# Patient Record
Sex: MALE | Race: OTHER | NOT HISPANIC OR LATINO | URBAN - METROPOLITAN AREA
[De-identification: names, ages, dates, MRNs, and addresses within clinical notes are randomized per-mention and may not be internally consistent; named-entity substitution may affect disease eponyms.]

---

## 2018-01-03 ENCOUNTER — INPATIENT (INPATIENT)
Facility: HOSPITAL | Age: 28
LOS: 4 days | Discharge: HOME | DRG: 872 | End: 2018-01-08
Attending: INTERNAL MEDICINE | Admitting: INTERNAL MEDICINE
Payer: COMMERCIAL

## 2018-01-03 VITALS
RESPIRATION RATE: 18 BRPM | HEART RATE: 109 BPM | TEMPERATURE: 102 F | OXYGEN SATURATION: 98 % | SYSTOLIC BLOOD PRESSURE: 113 MMHG | WEIGHT: 113.1 LBS | HEIGHT: 65 IN | DIASTOLIC BLOOD PRESSURE: 69 MMHG

## 2018-01-03 LAB
ALBUMIN SERPL ELPH-MCNC: 3.3 G/DL — SIGNIFICANT CHANGE UP (ref 3.3–5)
ALP SERPL-CCNC: 69 U/L — SIGNIFICANT CHANGE UP (ref 30–120)
ALT FLD-CCNC: 17 U/L DA — SIGNIFICANT CHANGE UP (ref 10–60)
ANION GAP SERPL CALC-SCNC: 10 MMOL/L — SIGNIFICANT CHANGE UP (ref 5–17)
APPEARANCE UR: ABNORMAL
APTT BLD: 31.5 SEC — SIGNIFICANT CHANGE UP (ref 27.5–37.4)
AST SERPL-CCNC: 15 U/L — SIGNIFICANT CHANGE UP (ref 10–40)
BASOPHILS # BLD AUTO: 0.1 K/UL — SIGNIFICANT CHANGE UP (ref 0–0.2)
BASOPHILS NFR BLD AUTO: 1.1 % — SIGNIFICANT CHANGE UP (ref 0–2)
BILIRUB SERPL-MCNC: 0.5 MG/DL — SIGNIFICANT CHANGE UP (ref 0.2–1.2)
BILIRUB UR-MCNC: NEGATIVE — SIGNIFICANT CHANGE UP
BUN SERPL-MCNC: 17 MG/DL — SIGNIFICANT CHANGE UP (ref 7–23)
CALCIUM SERPL-MCNC: 8.8 MG/DL — SIGNIFICANT CHANGE UP (ref 8.4–10.5)
CHLORIDE SERPL-SCNC: 98 MMOL/L — SIGNIFICANT CHANGE UP (ref 96–108)
CO2 SERPL-SCNC: 25 MMOL/L — SIGNIFICANT CHANGE UP (ref 22–31)
COLOR SPEC: YELLOW — SIGNIFICANT CHANGE UP
CREAT SERPL-MCNC: 1.23 MG/DL — SIGNIFICANT CHANGE UP (ref 0.5–1.3)
DIFF PNL FLD: ABNORMAL
EOSINOPHIL # BLD AUTO: 0 K/UL — SIGNIFICANT CHANGE UP (ref 0–0.5)
EOSINOPHIL NFR BLD AUTO: 0.1 % — SIGNIFICANT CHANGE UP (ref 0–6)
GLUCOSE SERPL-MCNC: 112 MG/DL — HIGH (ref 70–99)
GLUCOSE UR QL: NEGATIVE MG/DL — SIGNIFICANT CHANGE UP
HCT VFR BLD CALC: 41 % — SIGNIFICANT CHANGE UP (ref 39–50)
HGB BLD-MCNC: 12.8 G/DL — LOW (ref 13–17)
INR BLD: 1.27 RATIO — HIGH (ref 0.88–1.16)
KETONES UR-MCNC: NEGATIVE — SIGNIFICANT CHANGE UP
LACTATE SERPL-SCNC: 0.8 MMOL/L — SIGNIFICANT CHANGE UP (ref 0.7–2)
LEUKOCYTE ESTERASE UR-ACNC: ABNORMAL
LYMPHOCYTES # BLD AUTO: 0.9 K/UL — LOW (ref 1–3.3)
LYMPHOCYTES # BLD AUTO: 9 % — LOW (ref 13–44)
MCHC RBC-ENTMCNC: 25.9 PG — LOW (ref 27–34)
MCHC RBC-ENTMCNC: 31.2 GM/DL — LOW (ref 32–36)
MCV RBC AUTO: 83.2 FL — SIGNIFICANT CHANGE UP (ref 80–100)
MONOCYTES # BLD AUTO: 1 K/UL — HIGH (ref 0–0.9)
MONOCYTES NFR BLD AUTO: 10.1 % — SIGNIFICANT CHANGE UP (ref 2–14)
NEUTROPHILS # BLD AUTO: 8.1 K/UL — HIGH (ref 1.8–7.4)
NEUTROPHILS NFR BLD AUTO: 79.8 % — HIGH (ref 43–77)
NITRITE UR-MCNC: POSITIVE
PH UR: 8 — SIGNIFICANT CHANGE UP (ref 5–8)
PLATELET # BLD AUTO: 231 K/UL — SIGNIFICANT CHANGE UP (ref 150–400)
POTASSIUM SERPL-MCNC: 3.7 MMOL/L — SIGNIFICANT CHANGE UP (ref 3.5–5.3)
POTASSIUM SERPL-SCNC: 3.7 MMOL/L — SIGNIFICANT CHANGE UP (ref 3.5–5.3)
PROT SERPL-MCNC: 7.9 G/DL — SIGNIFICANT CHANGE UP (ref 6–8.3)
PROT UR-MCNC: 30 MG/DL
PROTHROM AB SERPL-ACNC: 13.9 SEC — HIGH (ref 9.8–12.7)
RBC # BLD: 4.92 M/UL — SIGNIFICANT CHANGE UP (ref 4.2–5.8)
RBC # FLD: 12.6 % — SIGNIFICANT CHANGE UP (ref 10.3–14.5)
SODIUM SERPL-SCNC: 133 MMOL/L — LOW (ref 135–145)
SP GR SPEC: 1.01 — SIGNIFICANT CHANGE UP (ref 1.01–1.02)
UROBILINOGEN FLD QL: NEGATIVE MG/DL — SIGNIFICANT CHANGE UP
WBC # BLD: 10.2 K/UL — SIGNIFICANT CHANGE UP (ref 3.8–10.5)
WBC # FLD AUTO: 10.2 K/UL — SIGNIFICANT CHANGE UP (ref 3.8–10.5)

## 2018-01-03 PROCEDURE — 74176 CT ABD & PELVIS W/O CONTRAST: CPT | Mod: 26

## 2018-01-03 RX ORDER — SODIUM CHLORIDE 9 MG/ML
1000 INJECTION INTRAMUSCULAR; INTRAVENOUS; SUBCUTANEOUS ONCE
Qty: 0 | Refills: 0 | Status: COMPLETED | OUTPATIENT
Start: 2018-01-03 | End: 2018-01-03

## 2018-01-03 RX ORDER — ACETAMINOPHEN 500 MG
650 TABLET ORAL ONCE
Qty: 0 | Refills: 0 | Status: COMPLETED | OUTPATIENT
Start: 2018-01-03 | End: 2018-01-03

## 2018-01-03 RX ORDER — SODIUM CHLORIDE 9 MG/ML
3 INJECTION INTRAMUSCULAR; INTRAVENOUS; SUBCUTANEOUS ONCE
Qty: 0 | Refills: 0 | Status: COMPLETED | OUTPATIENT
Start: 2018-01-03 | End: 2018-01-03

## 2018-01-03 RX ORDER — CEFTRIAXONE 500 MG/1
1 INJECTION, POWDER, FOR SOLUTION INTRAMUSCULAR; INTRAVENOUS ONCE
Qty: 0 | Refills: 0 | Status: COMPLETED | OUTPATIENT
Start: 2018-01-03 | End: 2018-01-03

## 2018-01-03 RX ADMIN — SODIUM CHLORIDE 1000 MILLILITER(S): 9 INJECTION INTRAMUSCULAR; INTRAVENOUS; SUBCUTANEOUS at 22:55

## 2018-01-03 RX ADMIN — SODIUM CHLORIDE 3 MILLILITER(S): 9 INJECTION INTRAMUSCULAR; INTRAVENOUS; SUBCUTANEOUS at 22:40

## 2018-01-03 RX ADMIN — Medication 650 MILLIGRAM(S): at 22:25

## 2018-01-03 RX ADMIN — SODIUM CHLORIDE 1000 MILLILITER(S): 9 INJECTION INTRAMUSCULAR; INTRAVENOUS; SUBCUTANEOUS at 21:55

## 2018-01-03 NOTE — ED PROVIDER NOTE - GASTROINTESTINAL, MLM
Abdomen soft, pos mild R mid abdominal tenderness. no guarding. no rebound. large healed scar diagonally across abdomen, LUQ to RLQ. stoma for suprapubic catheter noted.

## 2018-01-03 NOTE — ED PROVIDER NOTE - OBJECTIVE STATEMENT
28 y/o M presents to the ED for fever and chills today. States that he has been having shooting pain under his R rib x few days, worse after eating, better after BM. Today, he started experiencing fevers, chills, body aches, and dizziness. No sick contact. No n/v, or sore throat. 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 Pt uses suprapubic catheter to urinate. Hx of kidney stones- notes pain does not feel similar to a prior episode. No pmd. Urologist in [place].

## 2018-01-04 DIAGNOSIS — N12 TUBULO-INTERSTITIAL NEPHRITIS, NOT SPECIFIED AS ACUTE OR CHRONIC: ICD-10-CM

## 2018-01-04 DIAGNOSIS — Q64.79 OTHER CONGENITAL MALFORMATIONS OF BLADDER AND URETHRA: Chronic | ICD-10-CM

## 2018-01-04 DIAGNOSIS — N20.0 CALCULUS OF KIDNEY: ICD-10-CM

## 2018-01-04 LAB
ANION GAP SERPL CALC-SCNC: 10 MMOL/L — SIGNIFICANT CHANGE UP (ref 5–17)
BUN SERPL-MCNC: 12 MG/DL — SIGNIFICANT CHANGE UP (ref 7–23)
CALCIUM SERPL-MCNC: 8 MG/DL — LOW (ref 8.4–10.5)
CHLORIDE SERPL-SCNC: 104 MMOL/L — SIGNIFICANT CHANGE UP (ref 96–108)
CO2 SERPL-SCNC: 24 MMOL/L — SIGNIFICANT CHANGE UP (ref 22–31)
CREAT SERPL-MCNC: 1.13 MG/DL — SIGNIFICANT CHANGE UP (ref 0.5–1.3)
GLUCOSE SERPL-MCNC: 96 MG/DL — SIGNIFICANT CHANGE UP (ref 70–99)
HCT VFR BLD CALC: 36.6 % — LOW (ref 39–50)
HGB BLD-MCNC: 12 G/DL — LOW (ref 13–17)
MCHC RBC-ENTMCNC: 26.9 PG — LOW (ref 27–34)
MCHC RBC-ENTMCNC: 32.7 GM/DL — SIGNIFICANT CHANGE UP (ref 32–36)
MCV RBC AUTO: 82.2 FL — SIGNIFICANT CHANGE UP (ref 80–100)
PLATELET # BLD AUTO: 182 K/UL — SIGNIFICANT CHANGE UP (ref 150–400)
POTASSIUM SERPL-MCNC: 4 MMOL/L — SIGNIFICANT CHANGE UP (ref 3.5–5.3)
POTASSIUM SERPL-SCNC: 4 MMOL/L — SIGNIFICANT CHANGE UP (ref 3.5–5.3)
RBC # BLD: 4.45 M/UL — SIGNIFICANT CHANGE UP (ref 4.2–5.8)
RBC # FLD: 12.1 % — SIGNIFICANT CHANGE UP (ref 10.3–14.5)
SODIUM SERPL-SCNC: 138 MMOL/L — SIGNIFICANT CHANGE UP (ref 135–145)
WBC # BLD: 10.4 K/UL — SIGNIFICANT CHANGE UP (ref 3.8–10.5)
WBC # FLD AUTO: 10.4 K/UL — SIGNIFICANT CHANGE UP (ref 3.8–10.5)

## 2018-01-04 PROCEDURE — 99285 EMERGENCY DEPT VISIT HI MDM: CPT

## 2018-01-04 RX ORDER — SODIUM CHLORIDE 9 MG/ML
1000 INJECTION INTRAMUSCULAR; INTRAVENOUS; SUBCUTANEOUS ONCE
Qty: 0 | Refills: 0 | Status: COMPLETED | OUTPATIENT
Start: 2018-01-04 | End: 2018-01-04

## 2018-01-04 RX ORDER — ACETAMINOPHEN 500 MG
650 TABLET ORAL EVERY 6 HOURS
Qty: 0 | Refills: 0 | Status: DISCONTINUED | OUTPATIENT
Start: 2018-01-04 | End: 2018-01-08

## 2018-01-04 RX ORDER — FAMOTIDINE 10 MG/ML
20 INJECTION INTRAVENOUS DAILY
Qty: 0 | Refills: 0 | Status: DISCONTINUED | OUTPATIENT
Start: 2018-01-04 | End: 2018-01-08

## 2018-01-04 RX ORDER — SODIUM CHLORIDE 9 MG/ML
1000 INJECTION INTRAMUSCULAR; INTRAVENOUS; SUBCUTANEOUS
Qty: 0 | Refills: 0 | Status: DISCONTINUED | OUTPATIENT
Start: 2018-01-04 | End: 2018-01-06

## 2018-01-04 RX ORDER — IBUPROFEN 200 MG
400 TABLET ORAL THREE TIMES A DAY
Qty: 0 | Refills: 0 | Status: DISCONTINUED | OUTPATIENT
Start: 2018-01-04 | End: 2018-01-04

## 2018-01-04 RX ORDER — CEFTRIAXONE 500 MG/1
1 INJECTION, POWDER, FOR SOLUTION INTRAMUSCULAR; INTRAVENOUS EVERY 24 HOURS
Qty: 0 | Refills: 0 | Status: DISCONTINUED | OUTPATIENT
Start: 2018-01-04 | End: 2018-01-06

## 2018-01-04 RX ADMIN — SODIUM CHLORIDE 125 MILLILITER(S): 9 INJECTION INTRAMUSCULAR; INTRAVENOUS; SUBCUTANEOUS at 01:45

## 2018-01-04 RX ADMIN — CEFTRIAXONE 100 GRAM(S): 500 INJECTION, POWDER, FOR SOLUTION INTRAMUSCULAR; INTRAVENOUS at 23:23

## 2018-01-04 RX ADMIN — CEFTRIAXONE 100 GRAM(S): 500 INJECTION, POWDER, FOR SOLUTION INTRAMUSCULAR; INTRAVENOUS at 00:09

## 2018-01-04 RX ADMIN — SODIUM CHLORIDE 125 MILLILITER(S): 9 INJECTION INTRAMUSCULAR; INTRAVENOUS; SUBCUTANEOUS at 08:16

## 2018-01-04 RX ADMIN — Medication 650 MILLIGRAM(S): at 23:23

## 2018-01-04 RX ADMIN — FAMOTIDINE 20 MILLIGRAM(S): 10 INJECTION INTRAVENOUS at 11:34

## 2018-01-04 NOTE — H&P ADULT - PROBLEM SELECTOR PLAN 1
shayy amor called  aggressive ivf and iv abx  fu cultures  serial motrin and tylenol for pain and fever  continue self catheriztion shayy amor called- case discussed with dr pardeep ramos - who for now recommends iv abx and ivf, no sx intervention at this time  aggressive ivf and iv abx  fu cultures  serial tylenol for pain and fever  continue self catheriztion

## 2018-01-04 NOTE — H&P ADULT - PROBLEM SELECTOR PLAN 2
gu eval  hx of stones and complex gu hx shayy amor called and discussed with gu and family  hx of stones and complex gu hx  no sx intervention per gu for now

## 2018-01-04 NOTE — H&P ADULT - HISTORY OF PRESENT ILLNESS
: 26 y/o M presents to the ED for fever and chills today. States that he has been having shooting pain under his R rib x few days, worse after eating, better after BM. Today, he started experiencing fevers, chills, body aches, and dizziness. No sick contact. No n/v, or sore throat. 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 Pt uses suprapubic catheter to urinate. Hx of kidney stones- notes pain does not feel similar to a prior episode. No pmd. Urologist in [place].

## 2018-01-05 LAB
ANION GAP SERPL CALC-SCNC: 9 MMOL/L — SIGNIFICANT CHANGE UP (ref 5–17)
BUN SERPL-MCNC: 11 MG/DL — SIGNIFICANT CHANGE UP (ref 7–23)
CALCIUM SERPL-MCNC: 8.7 MG/DL — SIGNIFICANT CHANGE UP (ref 8.4–10.5)
CHLORIDE SERPL-SCNC: 103 MMOL/L — SIGNIFICANT CHANGE UP (ref 96–108)
CO2 SERPL-SCNC: 23 MMOL/L — SIGNIFICANT CHANGE UP (ref 22–31)
CREAT SERPL-MCNC: 1.04 MG/DL — SIGNIFICANT CHANGE UP (ref 0.5–1.3)
GLUCOSE SERPL-MCNC: 101 MG/DL — HIGH (ref 70–99)
HCT VFR BLD CALC: 39.8 % — SIGNIFICANT CHANGE UP (ref 39–50)
HGB BLD-MCNC: 12.4 G/DL — LOW (ref 13–17)
MCHC RBC-ENTMCNC: 26.6 PG — LOW (ref 27–34)
MCHC RBC-ENTMCNC: 31.1 GM/DL — LOW (ref 32–36)
MCV RBC AUTO: 85.4 FL — SIGNIFICANT CHANGE UP (ref 80–100)
PLATELET # BLD AUTO: 166 K/UL — SIGNIFICANT CHANGE UP (ref 150–400)
POTASSIUM SERPL-MCNC: 4.1 MMOL/L — SIGNIFICANT CHANGE UP (ref 3.5–5.3)
POTASSIUM SERPL-SCNC: 4.1 MMOL/L — SIGNIFICANT CHANGE UP (ref 3.5–5.3)
RBC # BLD: 4.66 M/UL — SIGNIFICANT CHANGE UP (ref 4.2–5.8)
RBC # FLD: 12.2 % — SIGNIFICANT CHANGE UP (ref 10.3–14.5)
SODIUM SERPL-SCNC: 135 MMOL/L — SIGNIFICANT CHANGE UP (ref 135–145)
WBC # BLD: 6.2 K/UL — SIGNIFICANT CHANGE UP (ref 3.8–10.5)
WBC # FLD AUTO: 6.2 K/UL — SIGNIFICANT CHANGE UP (ref 3.8–10.5)

## 2018-01-05 RX ADMIN — Medication 650 MILLIGRAM(S): at 16:02

## 2018-01-05 RX ADMIN — CEFTRIAXONE 100 GRAM(S): 500 INJECTION, POWDER, FOR SOLUTION INTRAMUSCULAR; INTRAVENOUS at 22:47

## 2018-01-05 RX ADMIN — FAMOTIDINE 20 MILLIGRAM(S): 10 INJECTION INTRAVENOUS at 12:25

## 2018-01-05 RX ADMIN — SODIUM CHLORIDE 125 MILLILITER(S): 9 INJECTION INTRAMUSCULAR; INTRAVENOUS; SUBCUTANEOUS at 15:57

## 2018-01-05 RX ADMIN — Medication 650 MILLIGRAM(S): at 16:03

## 2018-01-06 DIAGNOSIS — A41.50 GRAM-NEGATIVE SEPSIS, UNSPECIFIED: ICD-10-CM

## 2018-01-06 DIAGNOSIS — N21.0 CALCULUS IN BLADDER: ICD-10-CM

## 2018-01-06 LAB
ANION GAP SERPL CALC-SCNC: 10 MMOL/L — SIGNIFICANT CHANGE UP (ref 5–17)
BUN SERPL-MCNC: 7 MG/DL — SIGNIFICANT CHANGE UP (ref 7–23)
CALCIUM SERPL-MCNC: 8.3 MG/DL — LOW (ref 8.4–10.5)
CHLORIDE SERPL-SCNC: 106 MMOL/L — SIGNIFICANT CHANGE UP (ref 96–108)
CO2 SERPL-SCNC: 24 MMOL/L — SIGNIFICANT CHANGE UP (ref 22–31)
CREAT SERPL-MCNC: 0.98 MG/DL — SIGNIFICANT CHANGE UP (ref 0.5–1.3)
CULTURE RESULTS: SIGNIFICANT CHANGE UP
E COLI DNA BLD POS QL NAA+NON-PROBE: SIGNIFICANT CHANGE UP
GLUCOSE SERPL-MCNC: 88 MG/DL — SIGNIFICANT CHANGE UP (ref 70–99)
GRAM STN FLD: SIGNIFICANT CHANGE UP
METHOD TYPE: SIGNIFICANT CHANGE UP
POTASSIUM SERPL-MCNC: 4 MMOL/L — SIGNIFICANT CHANGE UP (ref 3.5–5.3)
POTASSIUM SERPL-SCNC: 4 MMOL/L — SIGNIFICANT CHANGE UP (ref 3.5–5.3)
SODIUM SERPL-SCNC: 140 MMOL/L — SIGNIFICANT CHANGE UP (ref 135–145)
SPECIMEN SOURCE: SIGNIFICANT CHANGE UP

## 2018-01-06 RX ORDER — SODIUM CHLORIDE 9 MG/ML
1000 INJECTION INTRAMUSCULAR; INTRAVENOUS; SUBCUTANEOUS
Qty: 0 | Refills: 0 | Status: DISCONTINUED | OUTPATIENT
Start: 2018-01-06 | End: 2018-01-07

## 2018-01-06 RX ORDER — CEFTRIAXONE 500 MG/1
INJECTION, POWDER, FOR SOLUTION INTRAMUSCULAR; INTRAVENOUS
Qty: 0 | Refills: 0 | Status: DISCONTINUED | OUTPATIENT
Start: 2018-01-06 | End: 2018-01-07

## 2018-01-06 RX ORDER — CEFTRIAXONE 500 MG/1
2 INJECTION, POWDER, FOR SOLUTION INTRAMUSCULAR; INTRAVENOUS EVERY 24 HOURS
Qty: 0 | Refills: 0 | Status: DISCONTINUED | OUTPATIENT
Start: 2018-01-07 | End: 2018-01-07

## 2018-01-06 RX ORDER — CEFTRIAXONE 500 MG/1
2 INJECTION, POWDER, FOR SOLUTION INTRAMUSCULAR; INTRAVENOUS ONCE
Qty: 0 | Refills: 0 | Status: COMPLETED | OUTPATIENT
Start: 2018-01-06 | End: 2018-01-06

## 2018-01-06 RX ADMIN — FAMOTIDINE 20 MILLIGRAM(S): 10 INJECTION INTRAVENOUS at 12:30

## 2018-01-06 RX ADMIN — CEFTRIAXONE 100 GRAM(S): 500 INJECTION, POWDER, FOR SOLUTION INTRAMUSCULAR; INTRAVENOUS at 10:11

## 2018-01-06 RX ADMIN — SODIUM CHLORIDE 125 MILLILITER(S): 9 INJECTION INTRAMUSCULAR; INTRAVENOUS; SUBCUTANEOUS at 10:11

## 2018-01-06 NOTE — CONSULT NOTE ADULT - SUBJECTIVE AND OBJECTIVE BOX
Infectious Diseases Consult by Edvin Salcedo MD    Reason for Consult : E coli sepsis with complicated UTI    HPI:  : 28 y/o M presents to the ED for fever and chills today. States that he has been having shooting pain under his R rib x few days, worse after eating, better after BM. Today, he started experiencing fevers, chills, body aches, and dizziness. No sick contact. No n/v, or sore throat. 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 Pt uses suprapubic catheter to urinate as he has neobladder reconstruction . Hx of kidney stones- notes pain does not feel similar to a prior episode. No Local PMD, His  Urologist is  in [place]. He has hx of nephrolithiasis and bladder stones . He has poorly functional left kidney . He has had removal of bladder stones periodically last one done about a year ago.     He has not been on any antibiotics recently last antibiotics use was in July for URI. He denies any prior hx of MDR infections      Blood and urine cs from admission reported positive for E coli . He is on IV Rocephin 1 gram daily .         Past Medical & Surgical Hx:  PAST MEDICAL & SURGICAL HISTORY:  Bladder stone  Renal stones  Duplication of bladder , reconstructive surgery with neobladder reconstruction       Social History-- Single works at [employer]  EtOH: Socially   Tobacco: denies   Drug Use: denies    Travel/Environmental/Occupational History: Not contributory     FAMILY HISTORY: No family hx of nephrolitiasis      Allergies    No Known Allergies    Intolerances        Home/ Out patient  Medications :  Home Medications:      Current Inpatient Medications :    ANTIBIOTICS:   cefTRIAXone   IVPB 1 Gram(s) IV Intermittent every 24 hours      OTHER RELEVANT MEDICATIONS :  acetaminophen   Tablet 650 milliGRAM(s) Oral every 6 hours PRN  acetaminophen   Tablet. 650 milliGRAM(s) Oral every 6 hours PRN  famotidine    Tablet 20 milliGRAM(s) Oral daily  sodium chloride 0.9%. 1000 milliLiter(s) IV Continuous <Continuous>      ROS:  CONSTITUTIONAL:  Negative fever or chills, feels well, good appetite  EYES:  Negative  blurry vision or double vision  CARDIOVASCULAR:  Negative for chest pain or palpitations  RESPIRATORY:  Negative for cough, wheezing, or SOB   GASTROINTESTINAL:  Negative for nausea, vomiting, diarrhea, constipation, or abdominal pain  GENITOURINARY:  Positive for  frequency, urgency , dysuria or hematuria   NEUROLOGIC:  No headache, confusion, dizziness, lightheadedness  All other systems were reviewed and are negative          I&O's Detail    05 Jan 2018 07:01  -  06 Jan 2018 07:00  --------------------------------------------------------  IN:    IV PiggyBack: 50 mL    sodium chloride 0.9%.: 1125 mL  Total IN: 1175 mL    OUT:  Total OUT: 0 mL    Total NET: 1175 mL          Physical Exam:  Vital Signs Last 24 Hrs  T(C): 36.8 (06 Jan 2018 08:10), Max: 37 (05 Jan 2018 23:02)  T(F): 98.3 (06 Jan 2018 08:10), Max: 98.6 (05 Jan 2018 23:02)  HR: 76 (06 Jan 2018 08:10) (76 - 97)  BP: 118/66 (06 Jan 2018 08:10) (103/68 - 118/66)  BP(mean): --  RR: 16 (06 Jan 2018 08:10) (15 - 16)  SpO2: 98% (06 Jan 2018 08:10) (98% - 98%)      General: well developed well nourished, in no acute distress  Eyes: sclera anicteric, pupils equal and reactive to light  ENMT: buccal mucosa moist, pharynx not injected  Neck: supple, trachea midline  Lungs: clear, no wheeze/rhonchi  Cardiovascular: regular rate and rhythm, S1 S2  Abdomen: soft, nontender, no organomegaly present, bowel sounds normal, no supra pubic tenderness mild right CVA tenderness  Neurological:  alert and oriented x3, Cranial Nerves II-XII grossly intact  Skin:no increased ecchymosis/petechiae/purpura  Lymph Nodes: no palpable cervical/supraclavicular lymph nodes enlargements  Extremities: no cyanosis/clubbing/edema    Labs:                          12.4   6.2   )-----------( 166      ( 05 Jan 2018 08:59 )             39.8        140    |  106    |  7      ----------------------------<  88         ( 06 Jan 2018 07:27 )  4.0     |  24     |  0.98     Ca    8.3        ( 06 Jan 2018 07:27 )    Urine Microscopic-Add On (NC) (01.03.18 @ 22:15)    Epithelial Cells: Few    Bacteria: Many    Red Blood Cell - Urine: 6-10 /HPF    White Blood Cell - Urine: 11-25          RECENT CULTURES:    Culture - Urine (collected 04 Jan 2018 13:22)  Source: .Urine Clean Catch (Midstream)  Preliminary Report (05 Jan 2018 08:48):    >100,000 CFU/ml Escherichia coli    Culture - Blood (01.04.18 @ 12:54)    -  Escherichia coli: Detec    Gram Stain:   Growth in aerobic bottle: Gram Negative Rods    Specimen Source: .Blood Blood-Peripheral    Organism: Blood Culture PCR    Culture Results:   Growth in aerobic bottle: Gram Negative Rods  ***Blood Panel PCR results on this specimen are available  approximately 3 hours after the Gram stain result.***  Gram stain, PCR, and/or culture results may not always  correspond due to difference in methodologies.  ************************************************************  This PCR assay was performed using [employer].  The following targets are tested for: Enterococcus,  vancomycin resistant enterococci, Listeria monocytogenes,  coagulase negative staphylococci, S. aureus,  methicillin resistant S. aureus, Streptococcus agalactiae  (Group B), S. pneumoniae, S. pyogenes (Group A),  Acinetobacter baumannii, Enterobacter cloacae, E. coli,  Klebsiella oxytoca, K. pneumoniae, Proteus sp.,  Serratia marcescens, Haemophilus influenzae,  Neisseria meningitidis, Pseudomonas aeruginosa, Candida  albicans, C. glabrata, C krusei, C parapsilosis,  C. tropicalis and the KPC resistance gene.    Organism Identification: Blood Culture PCR    Method Type: PCR      Culture - Blood (collected 04 Jan 2018 12:54)  Source: .Blood Blood-Peripheral  Preliminary Report (05 Jan 2018 13:01):    No growth to date.      RADIOLOGY & ADDITIONAL STUDIES:  < from: CT Renal Stone Hunt (01.03.18 @ 22:34) >  FINDINGS:    LOWER CHEST: Partially imaged, stomach, bowel and partial pancreas   containing left Bochdalek hernia.    LIVER: Within normal limits.  BILE DUCTS: Normal caliber.  GALLBLADDER: Within normal limits.  SPLEEN: Within normal limits.  PANCREAS: Within normal limits.  ADRENALS: Within normal limits.  KIDNEYS/URETERS/BLADDER: Mild to moderate left hydroureteronephrosis to   the level of the neobladder. No evidence of obstructive uropathy. Mild to   moderate left renal atrophy. Nonobstructing lower pole 5 mm renal   calculus. 2.2 cm nonobstructing right renal calculus. Blind-ending pouch   within the pelvis, contiguous with the right ureter demonstrates two   dependent nonobstructing calculi measuring up to 10 mm. Mild asymmetric   right perinephric stranding.    REPRODUCTIVE ORGANS: The prostate is within normal limits.    BOWEL: No bowel obstruction. Appendix not discretely identified. No   secondary signs of appendicitis.  PERITONEUM: No ascites.  VESSELS:  Within normal limits.  RETROPERITONEUM: No lymphadenopathy.    ABDOMINAL WALL: Within normal limits.  BONES: Within normal limits.    IMPRESSION:    Mild asymmetric right perinephric stranding, nonspecific, but may be seen   in the setting of pyelonephritis. Correlate with urinalysis.    Bilateral non obstructing renal calculi measuring up to 2.2 cm on the   right and 5 mm on the left.    Neobladder formation. Moderate left hydroureteronephrosis without   obstructing calculus.    Correlation with prior outside imaging, if available, is recommended to   evaluate for interval change.      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 who self catheterizes himself admitted with 2-3 days hx of fever and right flank pain . Has non obstructing right ureteral stone and mild right hydronephrosis. He has a poorly functional left kidney and left hydronephrosis.    He has E coli sepsis secondary to complicated  infection with right pyelonephritis . Don't suspect any MDR infections as he has been doing well and is afebrile     Plan :   - repeat blood cs x 2   - increase to Rocephin 2 grams daily pending cs results  - will change to pO antibiotics if repeat blood cs are negative   - trend CBC and renal function  - consider urology evaluation, he was advised to seek a local urology group who can coordinate his care with his urology group from [place]     Continue with present regime .  Appropriate use of antibiotics and adverse effects reviewed.      I have discussed the above plan of care with patient  in detail. He expressed understanding of the treatment plan . Risks, benefits and alternatives discussed in detail. I have asked if he has any questions or concerns and appropriately addressed them to the best of my ability .      > 45 minutes spent in direct patient care reviewing  the notes, lab data/ imaging , discussion with multidisciplinary team. All questions were addressed and answered to the best of my capacity .    Thank you for allowing me to participate in the care of your patient .      Edvin Salcedo MD  [phone]

## 2018-01-06 NOTE — PROGRESS NOTE ADULT - ASSESSMENT
Patient is a 27y old  Male who presents with a chief complaint of fever and chills (04 Jan 2018 07:44)      INTERVAL HPI/OVERNIGHT EVENTS:    MEDICATIONS  (STANDING):  cefTRIAXone   IVPB 2 Gram(s) IV Intermittent once  cefTRIAXone   IVPB      famotidine    Tablet 20 milliGRAM(s) Oral daily  sodium chloride 0.9%. 1000 milliLiter(s) (125 mL/Hr) IV Continuous <Continuous>    MEDICATIONS  (PRN):  acetaminophen   Tablet 650 milliGRAM(s) Oral every 6 hours PRN For Temp greater than 38 C (100.4 F)  acetaminophen   Tablet. 650 milliGRAM(s) Oral every 6 hours PRN Mild Pain (1 - 3)      Allergies    No Known Allergies    Intolerances        REVIEW OF SYSTEMS:  CONSTITUTIONAL: No fever, weight loss, or fatigue  EYES: No eye pain, visual disturbances  ENMT:  No difficulty hearing, tinnitus, vertigo; No sinus or throat pain  NECK: No pain or stiffness  RESPIRATORY: No cough, wheezing, chills or hemoptysis; No shortness of breath  CARDIOVASCULAR: No chest pain, palpitations, dizziness  GASTROINTESTINAL: No abdominal or epigastric pain. No nausea, vomiting, or hematemesis; No diarrhea or constipation. No melena or hematochezia.  GENITOURINARY: No dysuria, frequency, hematuria, or incontinence  NEUROLOGICAL: No headaches, memory loss, loss of strength, numbness, or tremors  SKIN: No itching, burning  LYMPH NODES: No enlarged glands  MUSCULOSKELETAL: No joint pain or swelling; No muscle, back, or extremity pain  PSYCHIATRIC: No depression, mood swings  HEME/LYMPH: No easy bruising, or bleeding gums  ALLERGY AND IMMUNOLOGIC: No hives    Vital Signs Last 24 Hrs  T(C): 36.8 (06 Jan 2018 08:10), Max: 37 (05 Jan 2018 23:02)  T(F): 98.3 (06 Jan 2018 08:10), Max: 98.6 (05 Jan 2018 23:02)  HR: 76 (06 Jan 2018 08:10) (76 - 97)  BP: 118/66 (06 Jan 2018 08:10) (103/68 - 118/66)  BP(mean): --  RR: 16 (06 Jan 2018 08:10) (15 - 16)  SpO2: 98% (06 Jan 2018 08:10) (98% - 98%)    PHYSICAL EXAM:  GENERAL: NAD, well-groomed, well-developed  HEAD:  Atraumatic, Normocephalic  EYES: EOMI, PERRLA, conjunctiva and sclera clear  ENMT: No tonsillar erythema, exudates, or enlargement   NECK: Supple, No JVD  NERVOUS SYSTEM:  Alert & Oriented X3, Good concentration  CHEST/LUNG: Clear to auscultation bilaterally; No rales, rhonchi, wheezing  HEART: Regular rate and rhythm  ABDOMEN: Soft, Nontender, Nondistended; Bowel sounds present  EXTREMITIES:  2+ Peripheral Pulses   LYMPH: No lymphadenopathy noted  SKIN: No rashes     LABS:    06 Jan 2018 07:27    140    |  106    |  7      ----------------------------<  88     4.0     |  24     |  0.98     Ca    8.3        06 Jan 2018 07:27          CAPILLARY BLOOD GLUCOSE        blood culture --   Culture grew 3 or more types of organisms which indicate  collection contamination; consider recollection only if clinically  indicated.   01-04 @ 13:22    blood culture --   No growth to date.   01-04 @ 12:54

## 2018-01-07 LAB
-  AMIKACIN: SIGNIFICANT CHANGE UP
-  AMPICILLIN/SULBACTAM: SIGNIFICANT CHANGE UP
-  AMPICILLIN: SIGNIFICANT CHANGE UP
-  AZTREONAM: SIGNIFICANT CHANGE UP
-  CEFAZOLIN: SIGNIFICANT CHANGE UP
-  CEFEPIME: SIGNIFICANT CHANGE UP
-  CEFOXITIN: SIGNIFICANT CHANGE UP
-  CEFTAZIDIME: SIGNIFICANT CHANGE UP
-  CEFTRIAXONE: SIGNIFICANT CHANGE UP
-  CIPROFLOXACIN: SIGNIFICANT CHANGE UP
-  ERTAPENEM: SIGNIFICANT CHANGE UP
-  GENTAMICIN: SIGNIFICANT CHANGE UP
-  IMIPENEM: SIGNIFICANT CHANGE UP
-  LEVOFLOXACIN: SIGNIFICANT CHANGE UP
-  MEROPENEM: SIGNIFICANT CHANGE UP
-  NITROFURANTOIN: SIGNIFICANT CHANGE UP
-  PIPERACILLIN/TAZOBACTAM: SIGNIFICANT CHANGE UP
-  TOBRAMYCIN: SIGNIFICANT CHANGE UP
-  TRIMETHOPRIM/SULFAMETHOXAZOLE: SIGNIFICANT CHANGE UP
ANION GAP SERPL CALC-SCNC: 11 MMOL/L — SIGNIFICANT CHANGE UP (ref 5–17)
BASOPHILS # BLD AUTO: 0.1 K/UL — SIGNIFICANT CHANGE UP (ref 0–0.2)
BASOPHILS NFR BLD AUTO: 1.2 % — SIGNIFICANT CHANGE UP (ref 0–2)
BUN SERPL-MCNC: 14 MG/DL — SIGNIFICANT CHANGE UP (ref 7–23)
CALCIUM SERPL-MCNC: 9.2 MG/DL — SIGNIFICANT CHANGE UP (ref 8.4–10.5)
CHLORIDE SERPL-SCNC: 103 MMOL/L — SIGNIFICANT CHANGE UP (ref 96–108)
CO2 SERPL-SCNC: 28 MMOL/L — SIGNIFICANT CHANGE UP (ref 22–31)
CREAT SERPL-MCNC: 1.06 MG/DL — SIGNIFICANT CHANGE UP (ref 0.5–1.3)
CRP SERPL-MCNC: 11 MG/DL — HIGH (ref 0–0.4)
CULTURE RESULTS: SIGNIFICANT CHANGE UP
EOSINOPHIL # BLD AUTO: 0.1 K/UL — SIGNIFICANT CHANGE UP (ref 0–0.5)
EOSINOPHIL NFR BLD AUTO: 3.2 % — SIGNIFICANT CHANGE UP (ref 0–6)
ERYTHROCYTE [SEDIMENTATION RATE] IN BLOOD: 77 MM/HR — HIGH (ref 0–9)
GLUCOSE SERPL-MCNC: 92 MG/DL — SIGNIFICANT CHANGE UP (ref 70–99)
HCT VFR BLD CALC: 41.6 % — SIGNIFICANT CHANGE UP (ref 39–50)
HGB BLD-MCNC: 13 G/DL — SIGNIFICANT CHANGE UP (ref 13–17)
LYMPHOCYTES # BLD AUTO: 1.8 K/UL — SIGNIFICANT CHANGE UP (ref 1–3.3)
LYMPHOCYTES # BLD AUTO: 43.2 % — SIGNIFICANT CHANGE UP (ref 13–44)
MCHC RBC-ENTMCNC: 26.7 PG — LOW (ref 27–34)
MCHC RBC-ENTMCNC: 31.2 GM/DL — LOW (ref 32–36)
MCV RBC AUTO: 85.5 FL — SIGNIFICANT CHANGE UP (ref 80–100)
METHOD TYPE: SIGNIFICANT CHANGE UP
MONOCYTES # BLD AUTO: 0.5 K/UL — SIGNIFICANT CHANGE UP (ref 0–0.9)
MONOCYTES NFR BLD AUTO: 11.6 % — SIGNIFICANT CHANGE UP (ref 2–14)
NEUTROPHILS # BLD AUTO: 1.7 K/UL — LOW (ref 1.8–7.4)
NEUTROPHILS NFR BLD AUTO: 40.8 % — LOW (ref 43–77)
ORGANISM # SPEC MICROSCOPIC CNT: SIGNIFICANT CHANGE UP
ORGANISM # SPEC MICROSCOPIC CNT: SIGNIFICANT CHANGE UP
PLATELET # BLD AUTO: 276 K/UL — SIGNIFICANT CHANGE UP (ref 150–400)
POTASSIUM SERPL-MCNC: 3.9 MMOL/L — SIGNIFICANT CHANGE UP (ref 3.5–5.3)
POTASSIUM SERPL-SCNC: 3.9 MMOL/L — SIGNIFICANT CHANGE UP (ref 3.5–5.3)
RBC # BLD: 4.86 M/UL — SIGNIFICANT CHANGE UP (ref 4.2–5.8)
RBC # FLD: 12.3 % — SIGNIFICANT CHANGE UP (ref 10.3–14.5)
SODIUM SERPL-SCNC: 142 MMOL/L — SIGNIFICANT CHANGE UP (ref 135–145)
WBC # BLD: 4.2 K/UL — SIGNIFICANT CHANGE UP (ref 3.8–10.5)
WBC # FLD AUTO: 4.2 K/UL — SIGNIFICANT CHANGE UP (ref 3.8–10.5)

## 2018-01-07 RX ORDER — CEFTRIAXONE 500 MG/1
2 INJECTION, POWDER, FOR SOLUTION INTRAMUSCULAR; INTRAVENOUS EVERY 24 HOURS
Qty: 0 | Refills: 0 | Status: DISCONTINUED | OUTPATIENT
Start: 2018-01-07 | End: 2018-01-08

## 2018-01-07 RX ADMIN — FAMOTIDINE 20 MILLIGRAM(S): 10 INJECTION INTRAVENOUS at 15:00

## 2018-01-07 RX ADMIN — CEFTRIAXONE 100 GRAM(S): 500 INJECTION, POWDER, FOR SOLUTION INTRAMUSCULAR; INTRAVENOUS at 09:01

## 2018-01-07 NOTE — PROGRESS NOTE ADULT - PROBLEM SELECTOR PLAN 2
Discussed f/u at tertiary care center for management of renal and bladder stones
due to e. coli  id eval noted  rpt blood cultures pending  trend labs and temp  rocephin increased
due to e. coli  id zuleyma noted  rpt blood cultures  trend labs and temp  rocephin increased
shayy amor called and discussed with gu and family  hx of stones and complex gu hx  no sx intervention per gu for now

## 2018-01-07 NOTE — PROGRESS NOTE ADULT - PROBLEM SELECTOR PLAN 1
await sensitivities from +'ve blood c&s. we will f/u here as needed
gu noted  abx and ivf, no sx intervention at this time  aggressive ivf and iv abx  fu cultures  serial tylenol for pain and fever  continue self catheriztion
shayy amor called- case discussed with dr pardeep ramos - who for now recommends iv abx and ivf, no sx intervention at this time  aggressive ivf and iv abx  fu cultures  serial tylenol for pain and fever  continue self catheriztion
shayy amor called- case discussed with dr pardeep ramso - who for now recommends iv abx and ivf, no sx intervention at this time  aggressive ivf and iv abx  fu cultures  serial tylenol for pain and fever  continue self catheriztion

## 2018-01-07 NOTE — PROGRESS NOTE ADULT - PROBLEM SELECTOR PLAN 3
see #2
shayy amor called and discussed with gu and family  hx of stones and complex gu hx  no sx intervention per gu for now
shayy amor called and discussed with gu and family  hx of stones and complex gu hx  no sx intervention per gu for now

## 2018-01-08 ENCOUNTER — TRANSCRIPTION ENCOUNTER (OUTPATIENT)
Age: 28
End: 2018-01-08

## 2018-01-08 VITALS
SYSTOLIC BLOOD PRESSURE: 106 MMHG | RESPIRATION RATE: 17 BRPM | OXYGEN SATURATION: 100 % | DIASTOLIC BLOOD PRESSURE: 68 MMHG | HEART RATE: 68 BPM | TEMPERATURE: 97 F

## 2018-01-08 LAB
-  AMIKACIN: SIGNIFICANT CHANGE UP
-  AMPICILLIN/SULBACTAM: SIGNIFICANT CHANGE UP
-  AMPICILLIN: SIGNIFICANT CHANGE UP
-  AZTREONAM: SIGNIFICANT CHANGE UP
-  CEFAZOLIN: SIGNIFICANT CHANGE UP
-  CEFEPIME: SIGNIFICANT CHANGE UP
-  CEFOXITIN: SIGNIFICANT CHANGE UP
-  CEFTAZIDIME: SIGNIFICANT CHANGE UP
-  CEFTRIAXONE: SIGNIFICANT CHANGE UP
-  CIPROFLOXACIN: SIGNIFICANT CHANGE UP
-  ERTAPENEM: SIGNIFICANT CHANGE UP
-  GENTAMICIN: SIGNIFICANT CHANGE UP
-  IMIPENEM: SIGNIFICANT CHANGE UP
-  LEVOFLOXACIN: SIGNIFICANT CHANGE UP
-  MEROPENEM: SIGNIFICANT CHANGE UP
-  PIPERACILLIN/TAZOBACTAM: SIGNIFICANT CHANGE UP
-  TOBRAMYCIN: SIGNIFICANT CHANGE UP
-  TRIMETHOPRIM/SULFAMETHOXAZOLE: SIGNIFICANT CHANGE UP
CULTURE RESULTS: SIGNIFICANT CHANGE UP
METHOD TYPE: SIGNIFICANT CHANGE UP
ORGANISM # SPEC MICROSCOPIC CNT: SIGNIFICANT CHANGE UP
SPECIMEN SOURCE: SIGNIFICANT CHANGE UP

## 2018-01-08 PROCEDURE — 87186 SC STD MICRODIL/AGAR DIL: CPT

## 2018-01-08 PROCEDURE — 85610 PROTHROMBIN TIME: CPT

## 2018-01-08 PROCEDURE — 99285 EMERGENCY DEPT VISIT HI MDM: CPT | Mod: 25

## 2018-01-08 PROCEDURE — 83605 ASSAY OF LACTIC ACID: CPT

## 2018-01-08 PROCEDURE — 96374 THER/PROPH/DIAG INJ IV PUSH: CPT

## 2018-01-08 PROCEDURE — 80048 BASIC METABOLIC PNL TOTAL CA: CPT

## 2018-01-08 PROCEDURE — 74176 CT ABD & PELVIS W/O CONTRAST: CPT

## 2018-01-08 PROCEDURE — 87150 DNA/RNA AMPLIFIED PROBE: CPT

## 2018-01-08 PROCEDURE — 85730 THROMBOPLASTIN TIME PARTIAL: CPT

## 2018-01-08 PROCEDURE — 85652 RBC SED RATE AUTOMATED: CPT

## 2018-01-08 PROCEDURE — 87086 URINE CULTURE/COLONY COUNT: CPT

## 2018-01-08 PROCEDURE — 87040 BLOOD CULTURE FOR BACTERIA: CPT

## 2018-01-08 PROCEDURE — 86140 C-REACTIVE PROTEIN: CPT

## 2018-01-08 PROCEDURE — 81001 URINALYSIS AUTO W/SCOPE: CPT

## 2018-01-08 PROCEDURE — 85027 COMPLETE CBC AUTOMATED: CPT

## 2018-01-08 PROCEDURE — 80053 COMPREHEN METABOLIC PANEL: CPT

## 2018-01-08 PROCEDURE — 36415 COLL VENOUS BLD VENIPUNCTURE: CPT

## 2018-01-08 RX ORDER — CIPROFLOXACIN LACTATE 400MG/40ML
1 VIAL (ML) INTRAVENOUS
Qty: 14 | Refills: 0 | OUTPATIENT
Start: 2018-01-08 | End: 2018-01-14

## 2018-01-08 RX ORDER — LACTOBACILLUS ACIDOPHILUS 100MM CELL
1 CAPSULE ORAL
Qty: 14 | Refills: 0
Start: 2018-01-08 | End: 2018-01-14

## 2018-01-08 RX ORDER — CIPROFLOXACIN LACTATE 400MG/40ML
1 VIAL (ML) INTRAVENOUS
Qty: 14 | Refills: 0
Start: 2018-01-08 | End: 2018-01-14

## 2018-01-08 RX ADMIN — CEFTRIAXONE 100 GRAM(S): 500 INJECTION, POWDER, FOR SOLUTION INTRAMUSCULAR; INTRAVENOUS at 09:33

## 2018-01-08 NOTE — DISCHARGE NOTE ADULT - MEDICATION SUMMARY - MEDICATIONS TO TAKE
I will START or STAY ON the medications listed below when I get home from the hospital:    Acidophilus oral capsule  -- 1 cap(s) by mouth 2 times a day   -- Indication: For Gi    ciprofloxacin 500 mg oral tablet  -- 1 tab(s) by mouth every 12 hours   -- Avoid prolonged or excessive exposure to direct and/or artificial sunlight while taking this medication.  Check with your doctor before becoming pregnant.  Do not take dairy products, antacids, or iron preparations within one hour of this medication.  Finish all this medication unless otherwise directed by prescriber.  Medication should be taken with plenty of water.    -- Indication: For uti

## 2018-01-08 NOTE — DISCHARGE NOTE ADULT - PATIENT PORTAL LINK FT
“You can access the [facility] Patient Portal, offered by [facility], by registering with the following website: [email or web address]”

## 2018-01-08 NOTE — DISCHARGE NOTE ADULT - PLAN OF CARE
remain infection free finish course of abx  fu with your gu doctor within one week finish course of abx

## 2018-01-08 NOTE — DISCHARGE NOTE ADULT - HOSPITAL COURSE
Admitted for fever and chills, noted to have pyelonephritis and gram neg sepsis from uti, seen by gu and id, no sx intervention for renal nonobstructing stone per gu, did well with iv abx, cleared by gu for dc on oral abx and will fu with his gu within one week, going home in stable condition.

## 2018-01-08 NOTE — PROGRESS NOTE ADULT - SUBJECTIVE AND OBJECTIVE BOX
ALLY RINCON is a 27yMale , patient examined and chart reviewed. Patient seen and examined today being followed for  E coli sepsis secondary to  infection       INTERVAL HPI/ OVERNIGHT EVENTS: Events noted , doing well, No new complaints feels better     PAST MEDICAL & SURGICAL HISTORY:  Bladder stone  Renal stones  Duplication of bladder      For details regarding the patient's social history, family history, and other miscellaneous elements, please refer the initial infectious diseases consultation and/or the admitting history and physical examination for this admission.      ROS:  CONSTITUTIONAL:  Negative fever or chills, feels well, good appetite  EYES:  Negative  blurry vision or double vision  CARDIOVASCULAR:  Negative for chest pain or palpitations  RESPIRATORY:  Negative for cough, wheezing, or SOB   GASTROINTESTINAL:  Negative for nausea, vomiting, diarrhea, constipation, or abdominal pain  GENITOURINARY:  Negative frequency, urgency or dysuria  NEUROLOGIC:  No headache, confusion, dizziness, lightheadedness  All other systems were reviewed and are negative     Allergies:      Current inpatient medications :    ANTIBIOTICS/RELEVANT:  cefTRIAXone   IVPB 2 Gram(s) IV Intermittent every 24 hours      acetaminophen   Tablet 650 milliGRAM(s) Oral every 6 hours PRN  acetaminophen   Tablet. 650 milliGRAM(s) Oral every 6 hours PRN  famotidine    Tablet 20 milliGRAM(s) Oral daily      Objective:    T(C): 36.1 (01-08-18 @ 07:39), Max: 36.7 (01-07-18 @ 15:35)  HR: 68 (01-08-18 @ 07:39) (58 - 76)  BP: 106/68 (01-08-18 @ 07:39) (102/69 - 106/68)  RR: 17 (01-08-18 @ 07:39) (17 - 18)  SpO2: 100% (01-08-18 @ 07:39) (99% - 100%)  Wt(kg): --      Physical Exam:  General: well developed well nourished, in no acute distress  Eyes: sclera anicteric, pupils equal and reactive to light  ENMT: buccal mucosa moist, pharynx not injected  Neck: supple, trachea midline  Lungs: clear, no wheeze/rhonchi  Cardiovascular: regular rate and rhythm, S1 S2  Abdomen: soft, nontender, no organomegaly present, bowel sounds normal, No CVA tenderness   Neurological: alert and oriented x3, Cranial Nerves II-XII grossly intact  Skin: no increased ecchymosis/petechiae/purpura  Lymph Nodes: no palpable cervical/supraclavicular lymph nodes enlargements  Extremities: no cyanosis/clubbing/edema      LABS:                          13.0   4.2   )-----------( 276      ( 07 Jan 2018 07:17 )             41.6       01-07    142  |  103  |  14  ----------------------------<  92  3.9   |  28  |  1.06    Ca    9.2      07 Jan 2018 07:17      RECENT CULTURES:      Culture - Blood (collected 06 Jan 2018 12:01)  Source: .Blood Blood-Peripheral  Preliminary Report (07 Jan 2018 13:01):    No growth to date.        Culture - Urine (01.04.18 @ 13:22)    -  Amikacin: S <=8    -  Ampicillin: R >16    -  Ampicillin/Sulbactam: R >16/8    -  Aztreonam: S <=4    -  Cefazolin: I 16    -  Cefepime: S <=2    -  Cefoxitin: S <=4    -  Ceftazidime: S <=1    -  Ceftriaxone: S <=1    -  Ciprofloxacin: S <=0.5    -  Ertapenem: S <=0.5    -  Gentamicin: S <=1    -  Tobramycin: S <=2    -  Trimethoprim/Sulfamethoxazole: R >2/38    -  Imipenem: S <=1    -  Levofloxacin: S <=1    -  Meropenem: S <=1    -  Nitrofurantoin: S <=32    -  Piperacillin/Tazobactam: S <=8    Specimen Source: .Urine Clean Catch (Midstream)    Culture Results:   Culture grew 3 or more types of organisms which indicate  collection contamination; consider recollection only if clinically  indicated. including  >100,000 CFU/ml Escherichia coli    Organism Identification: Escherichia coli    Organism: Escherichia coli    Method Type: REGI    Culture - Blood (01.04.18 @ 12:54)    Gram Stain:   Growth in aerobic bottle: Gram Negative Rods    -  Amikacin: S <=8    -  Ciprofloxacin: S <=0.5    -  Ertapenem: S <=0.5    -  Ceftazidime: S <=1    -  Ceftriaxone: S <=1    -  Cefepime: S <=2    -  Cefoxitin: S <=4    -  Aztreonam: S 8    -  Cefazolin: I 16    -  Ampicillin: S 8    -  Ampicillin/Sulbactam: S <=4/2    -  Trimethoprim/Sulfamethoxazole: S <=0.5/9.5    -  Escherichia coli: Detec    -  Piperacillin/Tazobactam: S <=8    -  Tobramycin: S <=2    -  Levofloxacin: S <=1    -  Meropenem: S <=1    -  Gentamicin: S 2    -  Imipenem: S <=1    Specimen Source: .Blood Blood-Peripheral    Organism: Blood Culture PCR    Organism: Escherichia coli    Culture Results:   Growth in aerobic bottle: Escherichia coli  ***Blood Panel PCR results on this specimen are available  approximately 3 hours after the Gram stain result.***  Gram stain, PCR, and/or culture results may not always  correspond due to difference in methodologies.  ************************************************************  This PCR assay was performed using [employer].  The following targets are tested for: Enterococcus,  vancomycin resistant enterococci, Listeria monocytogenes,  coagulase negative staphylococci, S. aureus,  methicillin resistant S. aureus, Streptococcus agalactiae  (Group B), S. pneumoniae, S. pyogenes (Group A),  Acinetobacter baumannii, Enterobacter cloacae, E. coli,  Klebsiella oxytoca, K. pneumoniae, Proteus sp.,  Serratia marcescens, Haemophilus influenzae,  Neisseria meningitidis, Pseudomonas aeruginosa, Candida  albicans, C. glabrata, C krusei, C parapsilosis,  C. tropicalis and the KPC resistance gene.    Organism Identification: Blood Culture PCR  Escherichia coli    Method Type: PCR    Method Type: REGI    RADIOLOGY & ADDITIONAL STUDIES:  CT Renal Stone Fitzpatrick (01.03.18 @ 22:34) >  FINDINGS:    IMPRESSION:    Mild asymmetric right perinephric stranding, nonspecific, but may be seen   in the setting of pyelonephritis. Correlate with urinalysis.    Bilateral non obstructing renal calculi measuring up to 2.2 cm on the   right and 5 mm on the left.    Neobladder formation. Moderate left hydroureteronephrosis without   obstructing calculus.    Correlation with prior outside imaging, if available, is recommended to   evaluate for interval change.      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 who self catheterizes himself admitted with 2-3 days hx of fever and right flank pain . Has non obstructing right ureteral stone and mild right hydronephrosis. He has a poorly functional left kidney and left hydronephrosis.    He has E coli sepsis secondary to complicated  infection with right pyelonephritis . Don't suspect any MDR infections as he has been doing well and is afebrile . Blood and urine cs shows pansensitive E coli     Plan :   - will change to po Cipro 500 mg bid x 7 days from am as repeat  blood cs are negative   - trend CBC and renal function  - possible dc home today   - he will follow up with his own Urologist   - patient advised to increase hydration     Continue with present regime .  Appropriate use of antibiotics and adverse effects reviewed.    I have discussed the above plan of care with patient in detail. He expressed understanding of the  treatment plan . Risks, benefits and alternatives discussed in detail. I have asked if he has any questions or concerns and appropriately addressed them to the best of my ability .    > 35 minutes were spent in direct patient care reviewing notes, medications ,labs data/ imaging , discussion with multidisciplinary team.    Thank you for allowing me to participate in care of your patient .    Edvin Salcedo MD  [phone]
ALLY RINCON is a 27yMale , patient examined and chart reviewed. Patient seen and examined today being followed for E coli sepsis most likely secondary to complicated  infection       INTERVAL HPI/ OVERNIGHT EVENTS: Events noted, doing well, no new complaints . denies any abdominal pain or flank pain . Urology follow up noted     PAST MEDICAL & SURGICAL HISTORY:  Bladder stone  Renal stones  Duplication of bladder      For details regarding the patient's social history, family history, and other miscellaneous elements, please refer the initial infectious diseases consultation and/or the admitting history and physical examination for this admission.    Unable to obtain due to :     ROS:  CONSTITUTIONAL:  Negative fever or chills, feels well, good appetite  EYES:  Negative  blurry vision or double vision  CARDIOVASCULAR:  Negative for chest pain or palpitations  RESPIRATORY:  Negative for cough, wheezing, or SOB   GASTROINTESTINAL:  Negative for nausea, vomiting, diarrhea, constipation, or abdominal pain  GENITOURINARY:  Negative frequency, urgency or dysuria  NEUROLOGIC:  No headache, confusion, dizziness, lightheadedness  All other systems were reviewed and are negative     Allergies:      Current inpatient medications :    ANTIBIOTICS/RELEVANT:  cefTRIAXone   IVPB 2 Gram(s) IV Intermittent every 24 hours      acetaminophen   Tablet 650 milliGRAM(s) Oral every 6 hours PRN  acetaminophen   Tablet. 650 milliGRAM(s) Oral every 6 hours PRN  famotidine    Tablet 20 milliGRAM(s) Oral daily  sodium chloride 0.9%. 1000 milliLiter(s) IV Continuous <Continuous>      Objective:    01-06 @ 07:01  -  01-07 @ 07:00  --------------------------------------------------------  IN: 750 mL / OUT: 0 mL / NET: 750 mL      T(C): 36.8 (01-07-18 @ 08:00), Max: 36.8 (01-07-18 @ 08:00)  HR: 66 (01-07-18 @ 08:00) (66 - 80)  BP: 109/71 (01-07-18 @ 08:00) (100/66 - 115/72)  RR: 18 (01-07-18 @ 08:00) (14 - 18)  SpO2: 98% (01-07-18 @ 08:00) (98% - 99%)  Wt(kg): --      Physical Exam:  General: well developed well nourished, in no acute distress  Eyes: sclera anicteric, pupils equal and reactive to light  ENMT: buccal mucosa moist, pharynx not injected  Neck: supple, trachea midline  Lungs: clear, no wheeze/rhonchi  Cardiovascular: regular rate and rhythm, S1 S2  Abdomen: soft, nontender, no organomegaly present, bowel sounds normal, NO CVA or supra pubic tenderness   Neurological: alert and oriented x3, Cranial Nerves II-XII grossly intact  Skin: no increased ecchymosis/petechiae/purpura  Lymph Nodes: no palpable cervical/supraclavicular lymph nodes enlargements  Extremities: no cyanosis/clubbing/edema            LABS:                          13.0   4.2   )-----------( 276      ( 07 Jan 2018 07:17 )             41.6       01-07    142  |  103  |  14  ----------------------------<  92  3.9   |  28  |  1.06    Ca    9.2      07 Jan 2018 07:17  Lactate, Blood (01.03.18 @ 22:15)    Lactate, Blood: 0.8 mmol/L    Sedimentation Rate, Erythrocyte (01.07.18 @ 07:17)    Sedimentation Rate, Erythrocyte: 77 mm/Hr      RECENT CULTURES:    Culture - Urine (collected 04 Jan 2018 13:22)  Source: .Urine Clean Catch (Midstream)  Final Report (06 Jan 2018 09:26):    Culture grew 3 or more types of organisms which indicate    collection contamination; consider recollection only if clinically    indicated.    Culture - Blood (collected 04 Jan 2018 12:54)  Source: .Blood Blood-Peripheral  Gram Stain (06 Jan 2018 05:23):    Growth in aerobic bottle: Gram Negative Rods  Preliminary Report (07 Jan 2018 08:52):    Growth in aerobic bottle: Escherichia coli    ***Blood Panel PCR results on this specimen are available    approximately 3 hours after the Gram stain result.***    Gram stain, PCR, and/or culture results may not always    correspond due to difference in methodologies.    ************************************************************    This PCR assay was performed using [employer].    The following targets are tested for: Enterococcus,    vancomycin resistant enterococci, Listeria monocytogenes,    coagulase negative staphylococci, S. aureus,    methicillin resistant S. aureus, Streptococcus agalactiae    (Group B), S. pneumoniae, S. pyogenes (Group A),    Acinetobacter baumannii, Enterobacter cloacae, E. coli,    Klebsiella oxytoca, K. pneumoniae, Proteus sp.,    Serratia marcescens, Haemophilus influenzae,    Neisseria meningitidis, Pseudomonas aeruginosa, Candida    albicans, C. glabrata, C krusei, C parapsilosis,    C. tropicalis and the KPC resistance gene.  Organism: Blood Culture PCR (06 Jan 2018 07:05)  Organism: Blood Culture PCR (06 Jan 2018 07:05)    Culture - Blood (collected 04 Jan 2018 12:54)  Source: .Blood Blood-Peripheral  Preliminary Report (05 Jan 2018 13:01):    No growth to date.    RADIOLOGY & ADDITIONAL STUDIES:  CT Renal Stone Fitzpatrick (01.03.18 @ 22:34) >  FINDINGS:    IMPRESSION:    Mild asymmetric right perinephric stranding, nonspecific, but may be seen   in the setting of pyelonephritis. Correlate with urinalysis.    Bilateral non obstructing renal calculi measuring up to 2.2 cm on the   right and 5 mm on the left.    Neobladder formation. Moderate left hydroureteronephrosis without   obstructing calculus.    Correlation with prior outside imaging, if available, is recommended to   evaluate for interval change.      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 who self catheterizes himself admitted with 2-3 days hx of fever and right flank pain . Has non obstructing right ureteral stone and mild right hydronephrosis. He has a poorly functional left kidney and left hydronephrosis.    He has E coli sepsis secondary to complicated  infection with right pyelonephritis . Don't suspect any MDR infections as he has been doing well and is afebrile     Plan :   - repeat blood cs pending   - continue with  Rocephin 2 grams daily pending cs results  - will change to pO antibiotics based on cs results if repeat blood cs are negative   - trend CBC and renal function  - repeat UA when next staight cath   - DC IVF   - possible dc in 24 hours     Continue with present regime .  Appropriate use of antibiotics and adverse effects reviewed.    I have discussed the above plan of care with patient  in detail. He expressed understanding of the  treatment plan . Risks, benefits and alternatives discussed in detail. I have asked if he has  any questions or concerns and appropriately addressed them to the best of my ability .    > 35 minutes were spent in direct patient care reviewing notes, medications ,labs data/ imaging , discussion with multidisciplinary team.    Thank you for allowing me to participate in care of your patient .    Edvin Salcedo MD  [phone]
INTERVAL HPI/OVERNIGHT EVENTS: feels well, no chills    MEDICATIONS  (STANDING):  cefTRIAXone   IVPB      famotidine    Tablet 20 milliGRAM(s) Oral daily  sodium chloride 0.9%. 1000 milliLiter(s) (125 mL/Hr) IV Continuous <Continuous>    MEDICATIONS  (PRN):  acetaminophen   Tablet 650 milliGRAM(s) Oral every 6 hours PRN For Temp greater than 38 C (100.4 F)  acetaminophen   Tablet. 650 milliGRAM(s) Oral every 6 hours PRN Mild Pain (1 - 3)        Vital Signs Last 24 Hrs  T(C): 36.8 (06 Jan 2018 08:10), Max: 37 (05 Jan 2018 23:02)  T(F): 98.3 (06 Jan 2018 08:10), Max: 98.6 (05 Jan 2018 23:02)  HR: 76 (06 Jan 2018 08:10) (76 - 97)  BP: 118/66 (06 Jan 2018 08:10) (103/68 - 118/66)  BP(mean): --  RR: 16 (06 Jan 2018 08:10) (15 - 16)  SpO2: 98% (06 Jan 2018 08:10) (98% - 98%)      LABS:                        12.4   6.2   )-----------( 166      ( 05 Jan 2018 08:59 )             39.8     01-06    140  |  106  |  7   ----------------------------<  88  4.0   |  24  |  0.98    Ca    8.3<L>      06 Jan 2018 07:27          Urine culture:  01-04 @ 13:22 --   Culture grew 3 or more types of organisms which indicate  collection contamination; consider recollection only if clinically  indicated.  Urine culture:  01-04 @ 12:54 --   No growth to date.  Culture - Blood (01.04.18 @ 12:54)    -  Escherichia coli: Detec    Gram Stain:   Growth in aerobic bottle: Gram Negative Rods    Specimen Source: .Blood Blood-Peripheral    Organism: Blood Culture PCR    Culture Results:   Growth in aerobic bottle: Gram Negative Rods  ***Blood Panel PCR results on this specimen are available  approximately 3 hours after the Gram stain result.***  Gram stain, PCR, and/or culture results may not always  correspond due to difference in methodologies.  ************************************************************  This PCR assay was performed using [employer].  The following targets are tested for: Enterococcus,  vancomycin resistant enterococci, Listeria monocytogenes,  coagulase negative staphylococci, S. aureus,  methicillin resistant S. aureus, Streptococcus agalactiae  (Group B), S. pneumoniae, S. pyogenes (Group A),  Acinetobacter baumannii, Enterobacter cloacae, E. coli,  Klebsiella oxytoca, K. pneumoniae, Proteus sp.,  Serratia marcescens, Haemophilus influenzae,  Neisseria meningitidis, Pseudomonas aeruginosa, Candida  albicans, C. glabrata, C krusei, C parapsilosis,  C. tropicalis and the KPC resistance gene.    Organism Identification: Blood Culture PCR    Method Type: PCR
Patient is a 27y old  Male who presents with a chief complaint of fever and chills (04 Jan 2018 07:44)      INTERVAL HPI/OVERNIGHT EVENTS: pt stable, feels well, await rpt blood cultures and urine,no fever now for 24 hrs, ID noted    MEDICATIONS  (STANDING):  cefTRIAXone   IVPB 2 Gram(s) IV Intermittent every 24 hours  famotidine    Tablet 20 milliGRAM(s) Oral daily    MEDICATIONS  (PRN):  acetaminophen   Tablet 650 milliGRAM(s) Oral every 6 hours PRN For Temp greater than 38 C (100.4 F)  acetaminophen   Tablet. 650 milliGRAM(s) Oral every 6 hours PRN Mild Pain (1 - 3)      Allergies    No Known Allergies    Intolerances        REVIEW OF SYSTEMS:  CONSTITUTIONAL: No fever, weight loss, or fatigue  EYES: No eye pain, visual disturbances  ENMT:  No difficulty hearing, tinnitus, vertigo; No sinus or throat pain  NECK: No pain or stiffness  RESPIRATORY: No cough, wheezing, chills or hemoptysis; No shortness of breath  CARDIOVASCULAR: No chest pain, palpitations, dizziness  GASTROINTESTINAL: No abdominal or epigastric pain. No nausea, vomiting, or hematemesis; No diarrhea or constipation. No melena or hematochezia.  GENITOURINARY: No dysuria, frequency, hematuria, or incontinence  NEUROLOGICAL: No headaches, memory loss, loss of strength, numbness, or tremors  SKIN: No itching, burning  LYMPH NODES: No enlarged glands  MUSCULOSKELETAL: No joint pain or swelling; No muscle, back, or extremity pain  PSYCHIATRIC: No depression, mood swings  HEME/LYMPH: No easy bruising, or bleeding gums  ALLERGY AND IMMUNOLOGIC: No hives    Vital Signs Last 24 Hrs  T(C): 36.8 (07 Jan 2018 08:00), Max: 36.8 (07 Jan 2018 08:00)  T(F): 98.2 (07 Jan 2018 08:00), Max: 98.2 (07 Jan 2018 08:00)  HR: 66 (07 Jan 2018 08:00) (66 - 77)  BP: 109/71 (07 Jan 2018 08:00) (109/71 - 115/72)  BP(mean): --  RR: 18 (07 Jan 2018 08:00) (16 - 18)  SpO2: 98% (07 Jan 2018 08:00) (98% - 98%)    PHYSICAL EXAM:  GENERAL: NAD, well-groomed, well-developed  HEAD:  Atraumatic, Normocephalic  EYES: EOMI, PERRLA, conjunctiva and sclera clear  ENMT: No tonsillar erythema, exudates, or enlargement   NECK: Supple, No JVD  NERVOUS SYSTEM:  Alert & Oriented X3, Good concentration  CHEST/LUNG: Clear to auscultation bilaterally; No rales, rhonchi, wheezing  HEART: Regular rate and rhythm  ABDOMEN: Soft, Nontender, Nondistended; Bowel sounds present  EXTREMITIES:  2+ Peripheral Pulses   LYMPH: No lymphadenopathy noted  SKIN: No rashes     LABS:                        13.0   4.2   )-----------( 276      ( 07 Jan 2018 07:17 )             41.6     07 Jan 2018 07:17    142    |  103    |  14     ----------------------------<  92     3.9     |  28     |  1.06     Ca    9.2        07 Jan 2018 07:17          CAPILLARY BLOOD GLUCOSE        blood culture --   No growth to date.   01-06 @ 12:01    blood culture --   Culture grew 3 or more types of organisms which indicate  collection contamination; consider recollection only if clinically  indicated. including  >100,000 CFU/ml Escherichia coli   01-04 @ 13:22    blood culture --   No growth to date.   01-04 @ 12:54      urine culture --  01-06 @ 12:01  results   No growth to date. 01-06 @ 12:01  urine culture --  01-04 @ 13:22  results   Culture grew 3 or more types of organisms which indicate  collection contamination; consider recollection only if clinically  indicated. including  >100,000 CFU/ml Escherichia coli 01-04 @ 13:22  urine culture --  01-04 @ 12:54  results   No growth to date. 01-04 @ 12:54    wound with gram statin --    01-06 @ 12:01  organism  --   01-06 @ 12:01  specimen source .Blood Blood-Peripheral  01-06 @ 12:01  wound with gram statin --    01-04 @ 13:22  organism  Escherichia coli   01-04 @ 13:22  specimen source .Urine Clean Catch (Midstream)  01-04 @ 13:22  wound with gram statin --    01-04 @ 12:54  organism  Blood Culture PCR   01-04 @ 12:54  specimen source .Blood Blood-Peripheral  01-04 @ 12:54      RADIOLOGY & ADDITIONAL TESTS:  no new      Consultant(s) Notes Reviewed:  [x ] YES  [ ] NO    Care Discussed with Consultants/Other Providers [x ] YES  [ ] NO    Advanced Care Planning Discussed with Patien/Family [ x] YES  [ ] NO
Patient is a 27y old  Male who presents with a chief complaint of fever and chills (04 Jan 2018 07:44)      INTERVAL HPI/OVERNIGHT EVENTS: stable, cultures noted, ID eval called and noted    MEDICATIONS  (STANDING):  cefTRIAXone   IVPB 2 Gram(s) IV Intermittent once  cefTRIAXone   IVPB      famotidine    Tablet 20 milliGRAM(s) Oral daily  sodium chloride 0.9%. 1000 milliLiter(s) (125 mL/Hr) IV Continuous <Continuous>    MEDICATIONS  (PRN):  acetaminophen   Tablet 650 milliGRAM(s) Oral every 6 hours PRN For Temp greater than 38 C (100.4 F)  acetaminophen   Tablet. 650 milliGRAM(s) Oral every 6 hours PRN Mild Pain (1 - 3)      Allergies    No Known Allergies    Intolerances        REVIEW OF SYSTEMS:  CONSTITUTIONAL: No fever, weight loss, or fatigue  EYES: No eye pain, visual disturbances  ENMT:  No difficulty hearing, tinnitus, vertigo; No sinus or throat pain  NECK: No pain or stiffness  RESPIRATORY: No cough, wheezing, chills or hemoptysis; No shortness of breath  CARDIOVASCULAR: No chest pain, palpitations, dizziness  GASTROINTESTINAL: No abdominal or epigastric pain. No nausea, vomiting, or hematemesis; No diarrhea or constipation. No melena or hematochezia.  GENITOURINARY: No dysuria, frequency, hematuria, or incontinence  NEUROLOGICAL: No headaches, memory loss, loss of strength, numbness, or tremors  SKIN: No itching, burning  LYMPH NODES: No enlarged glands  MUSCULOSKELETAL: No joint pain or swelling; No muscle, back, or extremity pain  PSYCHIATRIC: No depression, mood swings  HEME/LYMPH: No easy bruising, or bleeding gums  ALLERGY AND IMMUNOLOGIC: No hives    Vital Signs Last 24 Hrs  T(C): 36.8 (06 Jan 2018 08:10), Max: 37 (05 Jan 2018 23:02)  T(F): 98.3 (06 Jan 2018 08:10), Max: 98.6 (05 Jan 2018 23:02)  HR: 76 (06 Jan 2018 08:10) (76 - 97)  BP: 118/66 (06 Jan 2018 08:10) (103/68 - 118/66)  BP(mean): --  RR: 16 (06 Jan 2018 08:10) (15 - 16)  SpO2: 98% (06 Jan 2018 08:10) (98% - 98%)    PHYSICAL EXAM:  GENERAL: NAD, well-groomed, well-developed  HEAD:  Atraumatic, Normocephalic  EYES: EOMI, PERRLA, conjunctiva and sclera clear  ENMT: No tonsillar erythema, exudates, or enlargement   NECK: Supple, No JVD  NERVOUS SYSTEM:  Alert & Oriented X3, Good concentration  CHEST/LUNG: Clear to auscultation bilaterally; No rales, rhonchi, wheezing  HEART: Regular rate and rhythm  ABDOMEN: Soft, Nontender, Nondistended; Bowel sounds present  EXTREMITIES:  2+ Peripheral Pulses   LYMPH: No lymphadenopathy noted  SKIN: No rashes     LABS:    06 Jan 2018 07:27    140    |  106    |  7      ----------------------------<  88     4.0     |  24     |  0.98     Ca    8.3        06 Jan 2018 07:27          CAPILLARY BLOOD GLUCOSE        blood culture --   Culture grew 3 or more types of organisms which indicate  collection contamination; consider recollection only if clinically  indicated.   01-04 @ 13:22    blood culture --   No growth to date.   01-04 @ 12:54      urine culture --  01-04 @ 13:22  results   Culture grew 3 or more types of organisms which indicate  collection contamination; consider recollection only if clinically  indicated. 01-04 @ 13:22  urine culture --  01-04 @ 12:54  results   No growth to date. 01-04 @ 12:54    wound with gram statin --    01-04 @ 13:22  organism  --   01-04 @ 13:22  specimen source .Urine Clean Catch (Midstream)  01-04 @ 13:22  wound with gram statin --    01-04 @ 12:54  organism  Blood Culture PCR   01-04 @ 12:54  specimen source .Blood Blood-Peripheral  01-04 @ 12:54      RADIOLOGY & ADDITIONAL TESTS:  no new      Consultant(s) Notes Reviewed:  [x ] YES  [ ] NO    Care Discussed with Consultants/Other Providers [x ] YES  [ ] NO    Advanced Care Planning Discussed with Patien/Family [x ] YES  [ ] NO
Patient is a 27y old  Male who presents with a chief complaint of fever and chills (2018 07:44)      INTERVAL HPI/OVERNIGHT EVENTS: pt feels well today, much better, discussed with abel lucas cultures of urine.    MEDICATIONS  (STANDING):  cefTRIAXone   IVPB 1 Gram(s) IV Intermittent every 24 hours  famotidine    Tablet 20 milliGRAM(s) Oral daily  sodium chloride 0.9%. 1000 milliLiter(s) (125 mL/Hr) IV Continuous <Continuous>    MEDICATIONS  (PRN):  acetaminophen   Tablet 650 milliGRAM(s) Oral every 6 hours PRN For Temp greater than 38 C (100.4 F)  acetaminophen   Tablet. 650 milliGRAM(s) Oral every 6 hours PRN Mild Pain (1 - 3)      Allergies    No Known Allergies    Intolerances        REVIEW OF SYSTEMS:  CONSTITUTIONAL:low grade fever  EYES: No eye pain, visual disturbances  ENMT:  No difficulty hearing, tinnitus, vertigo; No sinus or throat pain  NECK: No pain or stiffness  RESPIRATORY: No cough, wheezing, chills or hemoptysis; No shortness of breath  CARDIOVASCULAR: No chest pain, palpitations, dizziness  GASTROINTESTINAL: No abdominal or epigastric pain. No nausea, vomiting, or hematemesis; No diarrhea or constipation. No melena or hematochezia.  GENITOURINARY: No dysuria, frequency, hematuria, or incontinence  NEUROLOGICAL: No headaches, memory loss, loss of strength, numbness, or tremors  SKIN: No itching, burning  LYMPH NODES: No enlarged glands  MUSCULOSKELETAL: No joint pain or swelling; No muscle, back, or extremity pain  PSYCHIATRIC: No depression, mood swings  HEME/LYMPH: No easy bruising, or bleeding gums  ALLERGY AND IMMUNOLOGIC: No hives    Vital Signs Last 24 Hrs  T(C): 37.2 (2018 07:12), Max: 38.8 (2018 23:20)  T(F): 99 (2018 07:12), Max: 101.9 (2018 23:20)  HR: 102 (2018 07:12) (89 - 105)  BP: 101/65 (2018 07:12) (99/68 - 112/74)  BP(mean): --  RR: 16 (2018 07:12) (12 - 16)  SpO2: 98% (2018 07:12) (97% - 98%)    PHYSICAL EXAM:  GENERAL: NAD, well-groomed, well-developed  HEAD:  Atraumatic, Normocephalic  EYES: EOMI, PERRLA, conjunctiva and sclera clear  ENMT: No tonsillar erythema, exudates, or enlargement   NECK: Supple, No JVD  NERVOUS SYSTEM:  Alert & Oriented X3, Good concentration  CHEST/LUNG: Clear to auscultation bilaterally; No rales, rhonchi, wheezing  HEART: Regular rate and rhythm  ABDOMEN: Soft, Nontender, Nondistended; Bowel sounds present  EXTREMITIES:  2+ Peripheral Pulses   LYMPH: No lymphadenopathy noted  SKIN: No rashes     LABS:                        12.4   6.2   )-----------( 166      ( 2018 08:59 )             39.8     2018 08:59    135    |  103    |  11     ----------------------------<  101    4.1     |  23     |  1.04     Ca    8.7        2018 08:59      PT/INR - ( 2018 22:15 )   PT: 13.9 sec;   INR: 1.27 ratio         PTT - ( 2018 22:15 )  PTT:31.5 sec  Urinalysis Basic - ( 2018 22:15 )    Color: Yellow / Appearance: Turbid / S.010 / pH: x  Gluc: x / Ketone: Negative  / Bili: Negative / Urobili: Negative mg/dL   Blood: x / Protein: 30 mg/dL / Nitrite: Positive   Leuk Esterase: Moderate / RBC: 6-10 /HPF / WBC 11-25   Sq Epi: x / Non Sq Epi: Few / Bacteria: Many      CAPILLARY BLOOD GLUCOSE        blood culture --   >100,000 CFU/ml Escherichia coli    @ 13:22      urine culture --   @ 13:22  results   >100,000 CFU/ml Escherichia coli  @ 13:22    wound with gram statin --     @ 13:22  organism  --    @ 13:22  specimen source .Urine Clean Catch (Midstream)   @ 13:22      RADIOLOGY & ADDITIONAL TESTS:  no new      Consultant(s) Notes Reviewed:  x[ ] YES  [ ] NO    Care Discussed with Consultants/Other Providers [x ] YES  [ ] NO    Advanced Care Planning Discussed with Patien/Family [ x] YES  [ ] NO

## 2018-01-08 NOTE — DISCHARGE NOTE ADULT - CARE PLAN
Principal Discharge DX:	Pyelonephritis  Goal:	remain infection free  Instructions for follow-up, activity and diet:	finish course of abx  fu with your gu doctor within one week  Secondary Diagnosis:	Gram negative sepsis  Instructions for follow-up, activity and diet:	finish course of abx

## 2018-01-09 LAB
CULTURE RESULTS: SIGNIFICANT CHANGE UP
SPECIMEN SOURCE: SIGNIFICANT CHANGE UP

## 2018-01-11 LAB
CULTURE RESULTS: SIGNIFICANT CHANGE UP
SPECIMEN SOURCE: SIGNIFICANT CHANGE UP

## 2019-01-21 NOTE — ED ADULT NURSE NOTE - NS ED NURSE LEVEL OF CONSCIOUSNESS AFFECT
· Med Name & Dose: one touch strips  · Last refill was 6- Number of Refills sent: 1  · Quantity of medication given 90 day supply  · Last visit for that condition 8-  · Date of next appt: 2/13/2019  · Date of any Lab results pertaining to medication:8-3-2018               Calm/Appropriate

## 2019-12-13 NOTE — H&P ADULT - PROBLEM/PLAN-1
DISPLAY PLAN FREE TEXT O-Z Flap Text: The defect edges were debeveled with a #15 scalpel blade.  Given the location of the defect, shape of the defect and the proximity to free margins an O-Z flap was deemed most appropriate.  Using a sterile surgical marker, an appropriate transposition flap was drawn incorporating the defect and placing the expected incisions within the relaxed skin tension lines where possible. The area thus outlined was incised deep to adipose tissue with a #15 scalpel blade.  The skin margins were undermined to an appropriate distance in all directions utilizing iris scissors.

## 2020-12-23 ENCOUNTER — TRANSCRIPTION ENCOUNTER (OUTPATIENT)
Age: 30
End: 2020-12-23

## 2021-06-03 NOTE — PATIENT PROFILE ADULT. - MENTAL HEALTH CONDITIONS/SYMPTOMS, PROFILE
none Peng Advancement Flap Text: The defect edges were debeveled with a #15 scalpel blade.  Given the location of the defect, shape of the defect and the proximity to free margins a Peng advancement flap was deemed most appropriate.  Using a sterile surgical marker, an appropriate advancement flap was drawn incorporating the defect and placing the expected incisions within the relaxed skin tension lines where possible. The area thus outlined was incised deep to adipose tissue with a #15 scalpel blade.  The skin margins were undermined to an appropriate distance in all directions utilizing iris scissors.

## 2021-09-15 NOTE — ED ADULT NURSE NOTE - NS ED NURSE REPORT GIVEN TO FT
Problem: Falls - Risk of:  Goal: Will remain free from falls  Description: Will remain free from falls  9/15/2021 0744 by Jack Meyer RN  Outcome: Met This Shift  9/15/2021 0034 by Jack Meyer RN  Outcome: Met This Shift  Goal: Absence of physical injury  Description: Absence of physical injury  9/15/2021 0744 by Jack Meyer RN  Outcome: Met This Shift  9/15/2021 0034 by Jack Meyer RN  Outcome: Met This Shift Loc [facility]

## 2022-03-07 NOTE — ED ADULT TRIAGE NOTE - PRO INTERPRETER NEED 2
English
For information on Fall & Injury Prevention, visit: [email or web address] OR  [email or web address] OR  https://www.cdc.gov/steadi/patient.html

## 2023-03-06 ENCOUNTER — EMERGENCY (EMERGENCY)
Facility: HOSPITAL | Age: 33
LOS: 1 days | Discharge: HOME | End: 2023-03-06
Attending: EMERGENCY MEDICINE | Admitting: EMERGENCY MEDICINE
Payer: COMMERCIAL

## 2023-03-06 VITALS
OXYGEN SATURATION: 100 % | RESPIRATION RATE: 18 BRPM | SYSTOLIC BLOOD PRESSURE: 119 MMHG | WEIGHT: 116.62 LBS | DIASTOLIC BLOOD PRESSURE: 70 MMHG | HEIGHT: 65 IN | HEART RATE: 77 BPM | TEMPERATURE: 98 F

## 2023-03-06 VITALS
OXYGEN SATURATION: 98 % | TEMPERATURE: 98 F | SYSTOLIC BLOOD PRESSURE: 100 MMHG | DIASTOLIC BLOOD PRESSURE: 60 MMHG | HEART RATE: 78 BPM | RESPIRATION RATE: 18 BRPM

## 2023-03-06 DIAGNOSIS — Q64.79 OTHER CONGENITAL MALFORMATIONS OF BLADDER AND URETHRA: Chronic | ICD-10-CM

## 2023-03-06 PROBLEM — N21.0 CALCULUS IN BLADDER: Chronic | Status: ACTIVE | Noted: 2018-01-04

## 2023-03-06 PROBLEM — N20.0 CALCULUS OF KIDNEY: Chronic | Status: ACTIVE | Noted: 2018-01-04

## 2023-03-06 LAB
ALBUMIN SERPL ELPH-MCNC: 3.9 G/DL — SIGNIFICANT CHANGE UP (ref 3.3–5)
ALP SERPL-CCNC: 71 U/L — SIGNIFICANT CHANGE UP (ref 30–120)
ALT FLD-CCNC: 26 U/L DA — SIGNIFICANT CHANGE UP (ref 10–60)
ANION GAP SERPL CALC-SCNC: 8 MMOL/L — SIGNIFICANT CHANGE UP (ref 5–17)
APPEARANCE UR: CLEAR — SIGNIFICANT CHANGE UP
AST SERPL-CCNC: 24 U/L — SIGNIFICANT CHANGE UP (ref 10–40)
BACTERIA # UR AUTO: ABNORMAL
BASOPHILS # BLD AUTO: 0.02 K/UL — SIGNIFICANT CHANGE UP (ref 0–0.2)
BASOPHILS NFR BLD AUTO: 0.3 % — SIGNIFICANT CHANGE UP (ref 0–2)
BILIRUB SERPL-MCNC: 0.7 MG/DL — SIGNIFICANT CHANGE UP (ref 0.2–1.2)
BILIRUB UR-MCNC: NEGATIVE — SIGNIFICANT CHANGE UP
BUN SERPL-MCNC: 17 MG/DL — SIGNIFICANT CHANGE UP (ref 7–23)
CALCIUM SERPL-MCNC: 8.7 MG/DL — SIGNIFICANT CHANGE UP (ref 8.4–10.5)
CHLORIDE SERPL-SCNC: 101 MMOL/L — SIGNIFICANT CHANGE UP (ref 96–108)
CO2 SERPL-SCNC: 28 MMOL/L — SIGNIFICANT CHANGE UP (ref 22–31)
COLOR SPEC: YELLOW — SIGNIFICANT CHANGE UP
CREAT SERPL-MCNC: 1.23 MG/DL — SIGNIFICANT CHANGE UP (ref 0.5–1.3)
DIFF PNL FLD: ABNORMAL
EGFR: 80 ML/MIN/1.73M2 — SIGNIFICANT CHANGE UP
EOSINOPHIL # BLD AUTO: 0.11 K/UL — SIGNIFICANT CHANGE UP (ref 0–0.5)
EOSINOPHIL NFR BLD AUTO: 1.7 % — SIGNIFICANT CHANGE UP (ref 0–6)
EPI CELLS # UR: SIGNIFICANT CHANGE UP
GLUCOSE SERPL-MCNC: 120 MG/DL — HIGH (ref 70–99)
GLUCOSE UR QL: NEGATIVE MG/DL — SIGNIFICANT CHANGE UP
HCT VFR BLD CALC: 42.1 % — SIGNIFICANT CHANGE UP (ref 39–50)
HGB BLD-MCNC: 13.3 G/DL — SIGNIFICANT CHANGE UP (ref 13–17)
IMM GRANULOCYTES NFR BLD AUTO: 0.2 % — SIGNIFICANT CHANGE UP (ref 0–0.9)
KETONES UR-MCNC: NEGATIVE — SIGNIFICANT CHANGE UP
LEUKOCYTE ESTERASE UR-ACNC: ABNORMAL
LIDOCAIN IGE QN: 93 U/L — SIGNIFICANT CHANGE UP (ref 73–393)
LYMPHOCYTES # BLD AUTO: 1.74 K/UL — SIGNIFICANT CHANGE UP (ref 1–3.3)
LYMPHOCYTES # BLD AUTO: 26.7 % — SIGNIFICANT CHANGE UP (ref 13–44)
MCHC RBC-ENTMCNC: 26.5 PG — LOW (ref 27–34)
MCHC RBC-ENTMCNC: 31.6 GM/DL — LOW (ref 32–36)
MCV RBC AUTO: 84 FL — SIGNIFICANT CHANGE UP (ref 80–100)
MONOCYTES # BLD AUTO: 0.51 K/UL — SIGNIFICANT CHANGE UP (ref 0–0.9)
MONOCYTES NFR BLD AUTO: 7.8 % — SIGNIFICANT CHANGE UP (ref 2–14)
NEUTROPHILS # BLD AUTO: 4.13 K/UL — SIGNIFICANT CHANGE UP (ref 1.8–7.4)
NEUTROPHILS NFR BLD AUTO: 63.3 % — SIGNIFICANT CHANGE UP (ref 43–77)
NITRITE UR-MCNC: NEGATIVE — SIGNIFICANT CHANGE UP
NRBC # BLD: 0 /100 WBCS — SIGNIFICANT CHANGE UP (ref 0–0)
PH UR: 7 — SIGNIFICANT CHANGE UP (ref 5–8)
PLATELET # BLD AUTO: 169 K/UL — SIGNIFICANT CHANGE UP (ref 150–400)
POTASSIUM SERPL-MCNC: 3.6 MMOL/L — SIGNIFICANT CHANGE UP (ref 3.5–5.3)
POTASSIUM SERPL-SCNC: 3.6 MMOL/L — SIGNIFICANT CHANGE UP (ref 3.5–5.3)
PROT SERPL-MCNC: 7.1 G/DL — SIGNIFICANT CHANGE UP (ref 6–8.3)
PROT UR-MCNC: 500 MG/DL
RBC # BLD: 5.01 M/UL — SIGNIFICANT CHANGE UP (ref 4.2–5.8)
RBC # FLD: 12.7 % — SIGNIFICANT CHANGE UP (ref 10.3–14.5)
RBC CASTS # UR COMP ASSIST: SIGNIFICANT CHANGE UP /HPF (ref 0–4)
SODIUM SERPL-SCNC: 137 MMOL/L — SIGNIFICANT CHANGE UP (ref 135–145)
SP GR SPEC: 1.01 — SIGNIFICANT CHANGE UP (ref 1.01–1.02)
UROBILINOGEN FLD QL: NEGATIVE MG/DL — SIGNIFICANT CHANGE UP
WBC # BLD: 6.52 K/UL — SIGNIFICANT CHANGE UP (ref 3.8–10.5)
WBC # FLD AUTO: 6.52 K/UL — SIGNIFICANT CHANGE UP (ref 3.8–10.5)
WBC UR QL: ABNORMAL

## 2023-03-06 PROCEDURE — 74177 CT ABD & PELVIS W/CONTRAST: CPT | Mod: 26,MA

## 2023-03-06 PROCEDURE — 80053 COMPREHEN METABOLIC PANEL: CPT

## 2023-03-06 PROCEDURE — 85025 COMPLETE CBC W/AUTO DIFF WBC: CPT

## 2023-03-06 PROCEDURE — 96374 THER/PROPH/DIAG INJ IV PUSH: CPT | Mod: XU

## 2023-03-06 PROCEDURE — 81001 URINALYSIS AUTO W/SCOPE: CPT

## 2023-03-06 PROCEDURE — 99284 EMERGENCY DEPT VISIT MOD MDM: CPT

## 2023-03-06 PROCEDURE — 74177 CT ABD & PELVIS W/CONTRAST: CPT | Mod: MA

## 2023-03-06 PROCEDURE — 83690 ASSAY OF LIPASE: CPT

## 2023-03-06 PROCEDURE — 96361 HYDRATE IV INFUSION ADD-ON: CPT

## 2023-03-06 PROCEDURE — 99284 EMERGENCY DEPT VISIT MOD MDM: CPT | Mod: 25

## 2023-03-06 PROCEDURE — 36415 COLL VENOUS BLD VENIPUNCTURE: CPT

## 2023-03-06 RX ORDER — KETOROLAC TROMETHAMINE 30 MG/ML
30 SYRINGE (ML) INJECTION ONCE
Refills: 0 | Status: DISCONTINUED | OUTPATIENT
Start: 2023-03-06 | End: 2023-03-06

## 2023-03-06 RX ORDER — SODIUM CHLORIDE 9 MG/ML
1000 INJECTION INTRAMUSCULAR; INTRAVENOUS; SUBCUTANEOUS ONCE
Refills: 0 | Status: COMPLETED | OUTPATIENT
Start: 2023-03-06 | End: 2023-03-06

## 2023-03-06 RX ADMIN — Medication 30 MILLIGRAM(S): at 02:02

## 2023-03-06 RX ADMIN — Medication 30 MILLIGRAM(S): at 01:47

## 2023-03-06 RX ADMIN — SODIUM CHLORIDE 1000 MILLILITER(S): 9 INJECTION INTRAMUSCULAR; INTRAVENOUS; SUBCUTANEOUS at 02:40

## 2023-03-06 RX ADMIN — SODIUM CHLORIDE 1000 MILLILITER(S): 9 INJECTION INTRAMUSCULAR; INTRAVENOUS; SUBCUTANEOUS at 01:47

## 2023-03-06 NOTE — ED PROVIDER NOTE - NSFOLLOWUPINSTRUCTIONS_ED_ALL_ED_FT
1) Follow up with your doctor in 1-2 days  2) Return to the ER for worsening or concerning symptoms      EnglishSpanish                                                                                                                                                     Abdominal Pain, Adult      Many things can cause belly (abdominal) pain. Most times, belly pain is not dangerous. Many cases of belly pain can be watched and treated at home. Sometimes, though, belly pain is serious. Your doctor will try to find the cause of your belly pain.      Follow these instructions at home:     Medicines     •Take over-the-counter and prescription medicines only as told by your doctor.      • Do not take medicines that help you poop (laxatives) unless told by your doctor.      General instructions     •Watch your belly pain for any changes.      •Drink enough fluid to keep your pee (urine) pale yellow.      •Keep all follow-up visits as told by your doctor. This is important.        Contact a doctor if:    •Your belly pain changes or gets worse.      •You are not hungry, or you lose weight without trying.      •You are having trouble pooping (constipated) or have watery poop (diarrhea) for more than 2–3 days.      •You have pain when you pee or poop.      •Your belly pain wakes you up at night.      •Your pain gets worse with meals, after eating, or with certain foods.      •You are vomiting and cannot keep anything down.      •You have a fever.      •You have blood in your pee.        Get help right away if:    •Your pain does not go away as soon as your doctor says it should.      •You cannot stop vomiting.      •Your pain is only in areas of your belly, such as the right side or the left lower part of the belly.      •You have bloody or black poop, or poop that looks like tar.      •You have very bad pain, cramping, or bloating in your belly.    •You have signs of not having enough fluid or water in your body (dehydration), such as:  •Dark pee, very little pee, or no pee.      •Cracked lips.      •Dry mouth.      •Sunken eyes.      •Sleepiness.      •Weakness.        •You have trouble breathing or chest pain.        Summary    •Many cases of belly pain can be watched and treated at home.      •Watch your belly pain for any changes.      •Take over-the-counter and prescription medicines only as told by your doctor.      •Contact a doctor if your belly pain changes or gets worse.      •Get help right away if you have very bad pain, cramping, or bloating in your belly.      This information is not intended to replace advice given to you by your health care provider. Make sure you discuss any questions you have with your health care provider.      Document Revised: 04/27/2020 Document Reviewed: 04/27/2020    Elsevier Patient Education © 2023 [employer] Inc[employer]

## 2023-03-06 NOTE — ED PROVIDER NOTE - PATIENT PORTAL LINK FT
You can access the FollowMyHealth Patient Portal offered by [facility] by registering at the following website: [email or web address]. By joining [facility]’s FollowMyHealth portal, you will also be able to view your health information using other applications (apps) compatible with our system.
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
## 2023-03-06 NOTE — ED PROVIDER NOTE - ABDOMINAL EXAM
multiple surgical scars noted. Mild tenderness on the right side of abdomen without guarding or rebound

## 2023-03-06 NOTE — ED PROVIDER NOTE - PROGRESS NOTE DETAILS
Pt is comfortably sleeping on re-evaluation  Pt informed of all results. CT abd neg for traumatic injury.  Advised f/u with his urologist.
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
## 2023-03-06 NOTE — ED ADULT TRIAGE NOTE - CHIEF COMPLAINT QUOTE
Yesterday I was playing volleyball and I collided into my friend and I have been having abdominal pain since; denies blood in urine

## 2023-03-08 NOTE — ED ADULT NURSE NOTE - NSSISCREENINGQ3_ED_A_ED
Diet, Consistent Carbohydrate w/Evening Snack:   DASH/TLC {Sodium & Cholesterol Restricted} (DASH)  Narinder (03-01-23 @ 21:52)   No

## 2023-04-29 ENCOUNTER — EMERGENCY (EMERGENCY)
Facility: HOSPITAL | Age: 33
LOS: 1 days | Discharge: ANOTHER HOSPITAL | End: 2023-04-29
Attending: EMERGENCY MEDICINE | Admitting: EMERGENCY MEDICINE
Payer: COMMERCIAL

## 2023-04-29 ENCOUNTER — INPATIENT (INPATIENT)
Facility: HOSPITAL | Age: 33
LOS: 2 days | Discharge: HOME HEALTH | DRG: 694 | End: 2023-05-02
Attending: STUDENT IN AN ORGANIZED HEALTH CARE EDUCATION/TRAINING PROGRAM | Admitting: STUDENT IN AN ORGANIZED HEALTH CARE EDUCATION/TRAINING PROGRAM
Payer: COMMERCIAL

## 2023-04-29 VITALS
HEART RATE: 96 BPM | DIASTOLIC BLOOD PRESSURE: 80 MMHG | WEIGHT: 116.62 LBS | OXYGEN SATURATION: 97 % | SYSTOLIC BLOOD PRESSURE: 130 MMHG | HEIGHT: 65 IN | TEMPERATURE: 98 F | RESPIRATION RATE: 16 BRPM

## 2023-04-29 VITALS
HEART RATE: 83 BPM | HEIGHT: 65 IN | DIASTOLIC BLOOD PRESSURE: 84 MMHG | SYSTOLIC BLOOD PRESSURE: 131 MMHG | TEMPERATURE: 98 F | RESPIRATION RATE: 17 BRPM | OXYGEN SATURATION: 99 % | WEIGHT: 119.93 LBS

## 2023-04-29 VITALS
OXYGEN SATURATION: 99 % | TEMPERATURE: 99 F | SYSTOLIC BLOOD PRESSURE: 127 MMHG | DIASTOLIC BLOOD PRESSURE: 78 MMHG | RESPIRATION RATE: 16 BRPM | HEART RATE: 77 BPM

## 2023-04-29 DIAGNOSIS — Q64.79 OTHER CONGENITAL MALFORMATIONS OF BLADDER AND URETHRA: Chronic | ICD-10-CM

## 2023-04-29 DIAGNOSIS — N20.1 CALCULUS OF URETER: ICD-10-CM

## 2023-04-29 LAB
ALBUMIN SERPL ELPH-MCNC: 3.7 G/DL — SIGNIFICANT CHANGE UP (ref 3.3–5)
ALP SERPL-CCNC: 68 U/L — SIGNIFICANT CHANGE UP (ref 30–120)
ALT FLD-CCNC: 16 U/L DA — SIGNIFICANT CHANGE UP (ref 10–60)
ANION GAP SERPL CALC-SCNC: 10 MMOL/L — SIGNIFICANT CHANGE UP (ref 5–17)
APPEARANCE UR: CLEAR — SIGNIFICANT CHANGE UP
AST SERPL-CCNC: 17 U/L — SIGNIFICANT CHANGE UP (ref 10–40)
BACTERIA # UR AUTO: ABNORMAL /HPF
BASOPHILS # BLD AUTO: 0.02 K/UL — SIGNIFICANT CHANGE UP (ref 0–0.2)
BASOPHILS NFR BLD AUTO: 0.4 % — SIGNIFICANT CHANGE UP (ref 0–2)
BILIRUB SERPL-MCNC: 0.5 MG/DL — SIGNIFICANT CHANGE UP (ref 0.2–1.2)
BILIRUB UR-MCNC: NEGATIVE — SIGNIFICANT CHANGE UP
BUN SERPL-MCNC: 23 MG/DL — SIGNIFICANT CHANGE UP (ref 7–23)
CALCIUM SERPL-MCNC: 9 MG/DL — SIGNIFICANT CHANGE UP (ref 8.4–10.5)
CHLORIDE SERPL-SCNC: 103 MMOL/L — SIGNIFICANT CHANGE UP (ref 96–108)
CO2 SERPL-SCNC: 27 MMOL/L — SIGNIFICANT CHANGE UP (ref 22–31)
COLOR SPEC: YELLOW — SIGNIFICANT CHANGE UP
CREAT SERPL-MCNC: 1.5 MG/DL — HIGH (ref 0.5–1.3)
DIFF PNL FLD: ABNORMAL
EGFR: 63 ML/MIN/1.73M2 — SIGNIFICANT CHANGE UP
EOSINOPHIL # BLD AUTO: 0.05 K/UL — SIGNIFICANT CHANGE UP (ref 0–0.5)
EOSINOPHIL NFR BLD AUTO: 1 % — SIGNIFICANT CHANGE UP (ref 0–6)
EPI CELLS # UR: SIGNIFICANT CHANGE UP
GLUCOSE SERPL-MCNC: 183 MG/DL — HIGH (ref 70–99)
GLUCOSE UR QL: NEGATIVE MG/DL — SIGNIFICANT CHANGE UP
HCT VFR BLD CALC: 42.8 % — SIGNIFICANT CHANGE UP (ref 39–50)
HGB BLD-MCNC: 13.5 G/DL — SIGNIFICANT CHANGE UP (ref 13–17)
IMM GRANULOCYTES NFR BLD AUTO: 0.2 % — SIGNIFICANT CHANGE UP (ref 0–0.9)
KETONES UR-MCNC: NEGATIVE MG/DL — SIGNIFICANT CHANGE UP
LACTATE SERPL-SCNC: 0.7 MMOL/L — SIGNIFICANT CHANGE UP (ref 0.7–2)
LEUKOCYTE ESTERASE UR-ACNC: ABNORMAL
LIDOCAIN IGE QN: 86 U/L — SIGNIFICANT CHANGE UP (ref 73–393)
LYMPHOCYTES # BLD AUTO: 1.1 K/UL — SIGNIFICANT CHANGE UP (ref 1–3.3)
LYMPHOCYTES # BLD AUTO: 21 % — SIGNIFICANT CHANGE UP (ref 13–44)
MCHC RBC-ENTMCNC: 26.5 PG — LOW (ref 27–34)
MCHC RBC-ENTMCNC: 31.5 GM/DL — LOW (ref 32–36)
MCV RBC AUTO: 84.1 FL — SIGNIFICANT CHANGE UP (ref 80–100)
MONOCYTES # BLD AUTO: 0.34 K/UL — SIGNIFICANT CHANGE UP (ref 0–0.9)
MONOCYTES NFR BLD AUTO: 6.5 % — SIGNIFICANT CHANGE UP (ref 2–14)
NEUTROPHILS # BLD AUTO: 3.72 K/UL — SIGNIFICANT CHANGE UP (ref 1.8–7.4)
NEUTROPHILS NFR BLD AUTO: 70.9 % — SIGNIFICANT CHANGE UP (ref 43–77)
NITRITE UR-MCNC: NEGATIVE — SIGNIFICANT CHANGE UP
NRBC # BLD: 0 /100 WBCS — SIGNIFICANT CHANGE UP (ref 0–0)
PH UR: 7 — SIGNIFICANT CHANGE UP (ref 5–8)
PLATELET # BLD AUTO: 202 K/UL — SIGNIFICANT CHANGE UP (ref 150–400)
POTASSIUM SERPL-MCNC: 4 MMOL/L — SIGNIFICANT CHANGE UP (ref 3.5–5.3)
POTASSIUM SERPL-SCNC: 4 MMOL/L — SIGNIFICANT CHANGE UP (ref 3.5–5.3)
PROT SERPL-MCNC: 7.1 G/DL — SIGNIFICANT CHANGE UP (ref 6–8.3)
PROT UR-MCNC: SIGNIFICANT CHANGE UP MG/DL
RBC # BLD: 5.09 M/UL — SIGNIFICANT CHANGE UP (ref 4.2–5.8)
RBC # FLD: 12.8 % — SIGNIFICANT CHANGE UP (ref 10.3–14.5)
RBC CASTS # UR COMP ASSIST: 2 /HPF — SIGNIFICANT CHANGE UP (ref 0–4)
SODIUM SERPL-SCNC: 140 MMOL/L — SIGNIFICANT CHANGE UP (ref 135–145)
SP GR SPEC: 1.01 — SIGNIFICANT CHANGE UP (ref 1–1.03)
UROBILINOGEN FLD QL: 0.2 MG/DL — SIGNIFICANT CHANGE UP (ref 0.2–1)
WBC # BLD: 5.24 K/UL — SIGNIFICANT CHANGE UP (ref 3.8–10.5)
WBC # FLD AUTO: 5.24 K/UL — SIGNIFICANT CHANGE UP (ref 3.8–10.5)
WBC UR QL: 14 /HPF — HIGH (ref 0–5)

## 2023-04-29 PROCEDURE — 96375 TX/PRO/DX INJ NEW DRUG ADDON: CPT

## 2023-04-29 PROCEDURE — 83690 ASSAY OF LIPASE: CPT

## 2023-04-29 PROCEDURE — 99285 EMERGENCY DEPT VISIT HI MDM: CPT | Mod: 25

## 2023-04-29 PROCEDURE — 96367 TX/PROPH/DG ADDL SEQ IV INF: CPT

## 2023-04-29 PROCEDURE — 99285 EMERGENCY DEPT VISIT HI MDM: CPT

## 2023-04-29 PROCEDURE — 85025 COMPLETE CBC W/AUTO DIFF WBC: CPT

## 2023-04-29 PROCEDURE — 87040 BLOOD CULTURE FOR BACTERIA: CPT

## 2023-04-29 PROCEDURE — 74176 CT ABD & PELVIS W/O CONTRAST: CPT | Mod: 26,MA

## 2023-04-29 PROCEDURE — 80053 COMPREHEN METABOLIC PANEL: CPT

## 2023-04-29 PROCEDURE — 87086 URINE CULTURE/COLONY COUNT: CPT

## 2023-04-29 PROCEDURE — 74176 CT ABD & PELVIS W/O CONTRAST: CPT | Mod: MA

## 2023-04-29 PROCEDURE — 96365 THER/PROPH/DIAG IV INF INIT: CPT

## 2023-04-29 PROCEDURE — 36415 COLL VENOUS BLD VENIPUNCTURE: CPT

## 2023-04-29 PROCEDURE — 83605 ASSAY OF LACTIC ACID: CPT

## 2023-04-29 PROCEDURE — 81001 URINALYSIS AUTO W/SCOPE: CPT

## 2023-04-29 RX ORDER — SODIUM CHLORIDE 9 MG/ML
1000 INJECTION INTRAMUSCULAR; INTRAVENOUS; SUBCUTANEOUS
Refills: 0 | Status: DISCONTINUED | OUTPATIENT
Start: 2023-04-29 | End: 2023-05-02

## 2023-04-29 RX ORDER — ACETAMINOPHEN 500 MG
1000 TABLET ORAL ONCE
Refills: 0 | Status: COMPLETED | OUTPATIENT
Start: 2023-04-29 | End: 2023-04-29

## 2023-04-29 RX ORDER — CEFTRIAXONE 500 MG/1
1000 INJECTION, POWDER, FOR SOLUTION INTRAMUSCULAR; INTRAVENOUS ONCE
Refills: 0 | Status: COMPLETED | OUTPATIENT
Start: 2023-04-29 | End: 2023-04-29

## 2023-04-29 RX ORDER — MORPHINE SULFATE 50 MG/1
2 CAPSULE, EXTENDED RELEASE ORAL ONCE
Refills: 0 | Status: DISCONTINUED | OUTPATIENT
Start: 2023-04-29 | End: 2023-04-29

## 2023-04-29 RX ORDER — CEFTRIAXONE 500 MG/1
INJECTION, POWDER, FOR SOLUTION INTRAMUSCULAR; INTRAVENOUS
Refills: 0 | Status: DISCONTINUED | OUTPATIENT
Start: 2023-04-29 | End: 2023-04-29

## 2023-04-29 RX ORDER — HYDROMORPHONE HYDROCHLORIDE 2 MG/ML
0.5 INJECTION INTRAMUSCULAR; INTRAVENOUS; SUBCUTANEOUS EVERY 4 HOURS
Refills: 0 | Status: DISCONTINUED | OUTPATIENT
Start: 2023-04-29 | End: 2023-05-02

## 2023-04-29 RX ORDER — ACETAMINOPHEN 500 MG
650 TABLET ORAL EVERY 6 HOURS
Refills: 0 | Status: DISCONTINUED | OUTPATIENT
Start: 2023-04-29 | End: 2023-04-29

## 2023-04-29 RX ORDER — CEFTRIAXONE 500 MG/1
1000 INJECTION, POWDER, FOR SOLUTION INTRAMUSCULAR; INTRAVENOUS EVERY 24 HOURS
Refills: 0 | Status: DISCONTINUED | OUTPATIENT
Start: 2023-04-30 | End: 2023-05-01

## 2023-04-29 RX ORDER — ACETAMINOPHEN 500 MG
1000 TABLET ORAL ONCE
Refills: 0 | Status: COMPLETED | OUTPATIENT
Start: 2023-04-30 | End: 2023-04-30

## 2023-04-29 RX ORDER — SODIUM CHLORIDE 9 MG/ML
1000 INJECTION INTRAMUSCULAR; INTRAVENOUS; SUBCUTANEOUS ONCE
Refills: 0 | Status: COMPLETED | OUTPATIENT
Start: 2023-04-29 | End: 2023-04-29

## 2023-04-29 RX ADMIN — MORPHINE SULFATE 2 MILLIGRAM(S): 50 CAPSULE, EXTENDED RELEASE ORAL at 16:39

## 2023-04-29 RX ADMIN — Medication 1000 MILLIGRAM(S): at 13:00

## 2023-04-29 RX ADMIN — Medication 400 MILLIGRAM(S): at 19:08

## 2023-04-29 RX ADMIN — Medication 400 MILLIGRAM(S): at 12:29

## 2023-04-29 RX ADMIN — SODIUM CHLORIDE 1000 MILLILITER(S): 9 INJECTION INTRAMUSCULAR; INTRAVENOUS; SUBCUTANEOUS at 12:29

## 2023-04-29 RX ADMIN — CEFTRIAXONE 1000 MILLIGRAM(S): 500 INJECTION, POWDER, FOR SOLUTION INTRAMUSCULAR; INTRAVENOUS at 14:28

## 2023-04-29 RX ADMIN — SODIUM CHLORIDE 1000 MILLILITER(S): 9 INJECTION INTRAMUSCULAR; INTRAVENOUS; SUBCUTANEOUS at 13:22

## 2023-04-29 RX ADMIN — CEFTRIAXONE 100 MILLIGRAM(S): 500 INJECTION, POWDER, FOR SOLUTION INTRAMUSCULAR; INTRAVENOUS at 13:28

## 2023-04-29 RX ADMIN — HYDROMORPHONE HYDROCHLORIDE 0.5 MILLIGRAM(S): 2 INJECTION INTRAMUSCULAR; INTRAVENOUS; SUBCUTANEOUS at 23:01

## 2023-04-29 RX ADMIN — SODIUM CHLORIDE 100 MILLILITER(S): 9 INJECTION INTRAMUSCULAR; INTRAVENOUS; SUBCUTANEOUS at 19:08

## 2023-04-29 RX ADMIN — HYDROMORPHONE HYDROCHLORIDE 0.5 MILLIGRAM(S): 2 INJECTION INTRAMUSCULAR; INTRAVENOUS; SUBCUTANEOUS at 22:31

## 2023-04-29 NOTE — ED PROVIDER NOTE - OBJECTIVE STATEMENT
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  Patient denies fevers chills nausea vomiting diarrhea hematuria.  PMSUSAN Romero ([place]) urologist at [place] ([place]

## 2023-04-29 NOTE — PATIENT PROFILE ADULT - FUNCTIONAL ASSESSMENT - BASIC MOBILITY 4.
unable to urinate since yesterday 9am. Discharged from  4/16. hx urinary retention. Powers removed 2 days ago. Denies fever/chills.
4 = No assist / stand by assistance

## 2023-04-29 NOTE — ED ADULT NURSE NOTE - OBJECTIVE STATEMENT
33 year old male with history of frequent UTI and Camilo Bladder presents to the ED with 7/10 right flank pain.  Denies fever, CP, SOB, N/V/D. 33 year old male with history of frequent UTI and Neobladder (self caths) presents to the ED with 7/10 right flank pain.  Denies fever, CP, SOB, N/V/D, hematuria.

## 2023-04-29 NOTE — ED PROVIDER NOTE - DIFFERENTIAL DIAGNOSIS
Differential including but not limited to UTI kidney stones diverticulitis colitis Differential Diagnosis

## 2023-04-29 NOTE — ED PROVIDER NOTE - ABDOMINAL EXAM
multiple healed surgical scars ant abd wall, + urostomy ant abd wall/soft/nontender.../tender.../nondistended

## 2023-04-29 NOTE — ED PROVIDER NOTE - CLINICAL SUMMARY MEDICAL DECISION MAKING FREE TEXT BOX
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  Patient denies fevers chills nausea vomiting diarrhea hematuria.  PMD Nick ([place]) urologist at [place] ([place])    Plan labs CT stone hunt IV fluid Ofirmev    Differential including but not limited to UTI kidney stones diverticulitis colitis

## 2023-04-29 NOTE — H&P ADULT - NSHPPHYSICALEXAM_GEN_ALL_CORE
Vital Signs Last 24 Hrs  T(C): 36.8 (29 Apr 2023 18:46), Max: 37.1 (29 Apr 2023 16:52)  T(F): 98.3 (29 Apr 2023 18:46), Max: 98.8 (29 Apr 2023 16:52)  HR: 83 (29 Apr 2023 18:46) (71 - 96)  BP: 131/84 (29 Apr 2023 18:46) (119/79 - 131/84)  BP(mean): --  RR: 17 (29 Apr 2023 18:46) (16 - 17)  SpO2: 99% (29 Apr 2023 18:46) (97% - 99%)    Parameters below as of 29 Apr 2023 18:46  Patient On (Oxygen Delivery Method): room air    Gen: NAD, AAOx3  Abd: soft, NT/ND  Back: R CVAT  Resp: no stridor or wheezing  : voiding Vital Signs Last 24 Hrs  T(C): 36.8 (29 Apr 2023 18:46), Max: 37.1 (29 Apr 2023 16:52)  T(F): 98.3 (29 Apr 2023 18:46), Max: 98.8 (29 Apr 2023 16:52)  HR: 83 (29 Apr 2023 18:46) (71 - 96)  BP: 131/84 (29 Apr 2023 18:46) (119/79 - 131/84)  BP(mean): --  RR: 17 (29 Apr 2023 18:46) (16 - 17)  SpO2: 99% (29 Apr 2023 18:46) (97% - 99%)    Parameters below as of 29 Apr 2023 18:46  Patient On (Oxygen Delivery Method): room air    Gen: NAD, AAOx3  Abd: soft, NT/ND  Back: R CVAT  Resp: no stridor or wheezing  : self  caths through neobladder

## 2023-04-29 NOTE — H&P ADULT - ASSESSMENT
33 y.o. M patient with 9x6mm right obstructing upj stone.  Has history of neobladder and self catheterizes.  Also history of atrophic left kidney.  No fevers.  WBC 5, Cre 1.5.  Stable vitals.

## 2023-04-29 NOTE — H&P ADULT - PROBLEM SELECTOR PLAN 1
-NPO  -F/U IR consult  -Pain control prn  -IS/SCDS  -OOB  -F/U blood and urine cultures -Discussed with on call  resident  -Ceftriaxone  -NPO  -F/U IR consult  -Pain control prn  -IS/SCDS  -OOB  -F/U blood and urine cultures

## 2023-04-29 NOTE — ED ADULT NURSE NOTE - CHPI ED NUR SYMPTOMS NEG
no blood in stool/no chills/no diarrhea/no fever/no nausea/no vomiting no blood in stool/no chills/no diarrhea/no fever/no hematuria/no nausea/no vomiting

## 2023-04-29 NOTE — ED ADULT NURSE NOTE - CAS EDN DISCHARGE INTERVENTIONS
24yo right hand dominant female patient who states that in October 2018, she had an acute onset of "shock sensation" in her right arm when lifting a heavy tray at work. She then had swelling and tingling in her right arm. She states that after that she was overusing her left hand and developed numbness, tingling and swelling in her left hand as well. She rates the pain at worst at 10/10, especially at night when trying to sleep. She needs assist w/ ADLs at times.  She is taking Ibuprofen 600mg w/o relief. She was told that she has bilateral Carpal Tunnel Syndrome and surgery was recommended. She presents today for PSTs.
IV intact

## 2023-04-29 NOTE — ED PROVIDER NOTE - PROGRESS NOTE DETAILS
called [facility] d/w ag ([facility]) he relates doesn't do mgmt of deborah-bladders, recommends [facility], xfer center to contact [facility] called [facility] center to brittney/estefania RUSSO uro D/W boom ([facility] [facility]) agrees with plan will accept xfer er->floor @ [facility]

## 2023-04-29 NOTE — H&P ADULT - NSHPLABSRESULTS_GEN_ALL_CORE
13.5   5.24  )-----------( 202      ( 2023 12:04 )             42.8         140  |  103  |  23  ----------------------------<  183<H>  4.0   |  27  |  1.50<H>    Ca    9.0      2023 12:04    TPro  7.1  /  Alb  3.7  /  TBili  0.5  /  DBili  x   /  AST  17  /  ALT  16  /  AlkPhos  68        Urinalysis Basic - ( 2023 11:30 )    Color: Yellow / Appearance: Clear / S.006 / pH: x  Gluc: x / Ketone: Negative mg/dL  / Bili: Negative / Urobili: 0.2 mg/dL   Blood: x / Protein: Trace mg/dL / Nitrite: Negative   Leuk Esterase: Large / RBC: 2 /HPF / WBC 14 /HPF   Sq Epi: x / Non Sq Epi: x / Bacteria: Occasional /HPF      CT of the Abdomen and Pelvis was performed in the prone position.  Sagittal and coronal reformats were performed.    FINDINGS:  LOWER CHEST: Again is noted severe elevation of the left hemidiaphragm.   LIVER: Within normal limits.  BILE DUCTS: Normal caliber.  GALLBLADDER: Within normal limits.  SPLEEN: Within normal limits.  PANCREAS: Within normal limits.  ADRENALS: Within normal limits.  KIDNEYS/URETERS: There is mild right perinephric stranding and moderate   right hydronephrosis secondary to a 9 x 6 mm stone at the UPJ on image   69. There are additional punctate nonobstructing right renal stones.   Again is noted a severely atrophic left kidney with multiple   nonobstructing stones.    BLADDER: Again is noted a neobladder in the anterior abdomen extending to   the umbilicus.  REPRODUCTIVE ORGANS: The prostate is not enlarged.    BOWEL: No bowel obstruction.  PERITONEUM: No ascites.  VESSELS: Within normal limits.  RETROPERITONEUM/LYMPH NODES: No lymphadenopathy.  ABDOMINAL WALL: Within normal limits.  BONES: Within normal limits.    IMPRESSION: Moderately obstructing right UPJ stone.

## 2023-04-29 NOTE — H&P ADULT - HISTORY OF PRESENT ILLNESS
33 y.o. M patient who was transferred from [place] for management of a 9x6 mm obstructing right upj stone.  Has right flank pain, but denies N/V/D/F. Self caths through neobladder, no purulence or blood seen. 33 y.o. M patient who was transferred from [place] for management of a 9x6 mm obstructing right upj stone.  Had right flank pain, but denies N/V/D/F.  Currently pain is manageable for patient, 6/10, sharp.   Self caths through neobladder, no purulence or blood seen.    His urologist is Marques Hightower at [facility].

## 2023-04-30 LAB
ANION GAP SERPL CALC-SCNC: 15 MMOL/L — SIGNIFICANT CHANGE UP (ref 5–17)
APTT BLD: 32.4 SEC — SIGNIFICANT CHANGE UP (ref 27.5–35.5)
BLD GP AB SCN SERPL QL: NEGATIVE — SIGNIFICANT CHANGE UP
BUN SERPL-MCNC: 16 MG/DL — SIGNIFICANT CHANGE UP (ref 7–23)
CALCIUM SERPL-MCNC: 8 MG/DL — LOW (ref 8.4–10.5)
CHLORIDE SERPL-SCNC: 105 MMOL/L — SIGNIFICANT CHANGE UP (ref 96–108)
CO2 SERPL-SCNC: 18 MMOL/L — LOW (ref 22–31)
CREAT SERPL-MCNC: 1.45 MG/DL — HIGH (ref 0.5–1.3)
EGFR: 66 ML/MIN/1.73M2 — SIGNIFICANT CHANGE UP
GLUCOSE SERPL-MCNC: 81 MG/DL — SIGNIFICANT CHANGE UP (ref 70–99)
HCT VFR BLD CALC: 41.2 % — SIGNIFICANT CHANGE UP (ref 39–50)
HGB BLD-MCNC: 12.8 G/DL — LOW (ref 13–17)
INR BLD: 1.14 — SIGNIFICANT CHANGE UP (ref 0.88–1.16)
MAGNESIUM SERPL-MCNC: 1.6 MG/DL — SIGNIFICANT CHANGE UP (ref 1.6–2.6)
MCHC RBC-ENTMCNC: 26.7 PG — LOW (ref 27–34)
MCHC RBC-ENTMCNC: 31.1 GM/DL — LOW (ref 32–36)
MCV RBC AUTO: 85.8 FL — SIGNIFICANT CHANGE UP (ref 80–100)
NRBC # BLD: 0 /100 WBCS — SIGNIFICANT CHANGE UP (ref 0–0)
PHOSPHATE SERPL-MCNC: 3.6 MG/DL — SIGNIFICANT CHANGE UP (ref 2.5–4.5)
PLATELET # BLD AUTO: 178 K/UL — SIGNIFICANT CHANGE UP (ref 150–400)
POTASSIUM SERPL-MCNC: 4 MMOL/L — SIGNIFICANT CHANGE UP (ref 3.5–5.3)
POTASSIUM SERPL-SCNC: 4 MMOL/L — SIGNIFICANT CHANGE UP (ref 3.5–5.3)
PROTHROM AB SERPL-ACNC: 13.6 SEC — HIGH (ref 10.5–13.4)
RBC # BLD: 4.8 M/UL — SIGNIFICANT CHANGE UP (ref 4.2–5.8)
RBC # FLD: 12.9 % — SIGNIFICANT CHANGE UP (ref 10.3–14.5)
RH IG SCN BLD-IMP: POSITIVE — SIGNIFICANT CHANGE UP
SODIUM SERPL-SCNC: 138 MMOL/L — SIGNIFICANT CHANGE UP (ref 135–145)
WBC # BLD: 10.53 K/UL — HIGH (ref 3.8–10.5)
WBC # FLD AUTO: 10.53 K/UL — HIGH (ref 3.8–10.5)

## 2023-04-30 PROCEDURE — 99232 SBSQ HOSP IP/OBS MODERATE 35: CPT

## 2023-04-30 PROCEDURE — 50432 PLMT NEPHROSTOMY CATHETER: CPT | Mod: RT

## 2023-04-30 RX ORDER — OXYCODONE HYDROCHLORIDE 5 MG/1
5 TABLET ORAL ONCE
Refills: 0 | Status: DISCONTINUED | OUTPATIENT
Start: 2023-04-30 | End: 2023-04-30

## 2023-04-30 RX ORDER — SODIUM CHLORIDE 9 MG/ML
500 INJECTION INTRAMUSCULAR; INTRAVENOUS; SUBCUTANEOUS ONCE
Refills: 0 | Status: COMPLETED | OUTPATIENT
Start: 2023-04-30 | End: 2023-04-30

## 2023-04-30 RX ADMIN — SODIUM CHLORIDE 100 MILLILITER(S): 9 INJECTION INTRAMUSCULAR; INTRAVENOUS; SUBCUTANEOUS at 21:12

## 2023-04-30 RX ADMIN — OXYCODONE HYDROCHLORIDE 5 MILLIGRAM(S): 5 TABLET ORAL at 22:12

## 2023-04-30 RX ADMIN — CEFTRIAXONE 100 MILLIGRAM(S): 500 INJECTION, POWDER, FOR SOLUTION INTRAMUSCULAR; INTRAVENOUS at 12:40

## 2023-04-30 RX ADMIN — Medication 400 MILLIGRAM(S): at 01:08

## 2023-04-30 RX ADMIN — SODIUM CHLORIDE 1000 MILLILITER(S): 9 INJECTION INTRAMUSCULAR; INTRAVENOUS; SUBCUTANEOUS at 21:45

## 2023-04-30 RX ADMIN — OXYCODONE HYDROCHLORIDE 5 MILLIGRAM(S): 5 TABLET ORAL at 21:12

## 2023-04-30 NOTE — PROGRESS NOTE ADULT - ASSESSMENT
33 y.o. M patient with 9x6mm right obstructing upj stone.  Has history of neobladder and self catheterizes.  Also history of atrophic left kidney.  No fevers.  WBC 5, Cre 1.5 on admission, AM labs pending.  Stable vitals overnight.

## 2023-04-30 NOTE — PROGRESS NOTE ADULT - SUBJECTIVE AND OBJECTIVE BOX
ALLY RINCON  [id number]  04-30-23 @ 05:02      UROLOGY SERVICE: DAILY PROGRESS NOTE    Chief admitting complaint: Right ureteral stone    No acute events overnight.  No N/V/D/F.  Pain moderately controlled for IV tylenol.      LABS:   29 Apr 2023 12:04    140    |  103    |  23     ----------------------------<  183    4.0     |  27     |  1.50     Ca    9.0        29 Apr 2023 12:04    TPro  7.1    /  Alb  3.7    /  TBili  0.5    /  DBili  x      /  AST  17     /  ALT  16     /  AlkPhos  68     29 Apr 2023 12:04                          13.5   5.24  )-----------( 202      ( 29 Apr 2023 12:04 )             42.8       I/O:  I&O's Summary    29 Apr 2023 07:01  -  30 Apr 2023 05:02  --------------------------------------------------------  IN: 200 mL / OUT: 1050 mL / NET: -850 mL        VITALS:  Vital Signs Last 24 Hrs  T(C): 37.3 (04-30-23 @ 00:04), Max: 37.3 (04-30-23 @ 00:04)  T(F): 99.2 (04-30-23 @ 00:04), Max: 99.2 (04-30-23 @ 00:04)  HR: 94 (04-30-23 @ 00:04) (71 - 96)  BP: 114/73 (04-30-23 @ 00:04) (114/73 - 131/84)  BP(mean): 94 (04-29-23 @ 22:23) (94 - 94)  RR: 17 (04-30-23 @ 00:04) (16 - 17)  SpO2: 100% (04-30-23 @ 00:04) (97% - 100%)      PHYSICAL EXAM:    GEN: NAD, AAOx3  ABD: soft, NT/ND, no guarding or rigidity, neobladder stoma present  EXT: b/l LE with SCDS on  SKIN: No rashes, lesions, ulcerations ALLY RINCON  [id number]  04-30-23 @ 05:02      UROLOGY SERVICE: DAILY PROGRESS NOTE    Chief admitting complaint: Right ureteral stone    No acute events overnight.  No N/V/D/F.  Pain moderately controlled for IV tylenol.      LABS:                         12.8   10.53 )-----------( 178      ( 30 Apr 2023 07:41 )             41.2   04-30    138  |  105  |  16  ----------------------------<  81  4.0   |  18<L>  |  1.45<H>    Ca    8.0<L>      30 Apr 2023 07:41  Phos  3.6     04-30  Mg     1.6     04-30    TPro  7.1  /  Alb  3.7  /  TBili  0.5  /  DBili  x   /  AST  17  /  ALT  16  /  AlkPhos  68  04-29      I/O:  I&O's Summary    29 Apr 2023 07:01  -  30 Apr 2023 05:02  --------------------------------------------------------  IN: 200 mL / OUT: 1050 mL / NET: -850 mL        VITALS:  Vital Signs Last 24 Hrs  T(C): 37.3 (04-30-23 @ 00:04), Max: 37.3 (04-30-23 @ 00:04)  T(F): 99.2 (04-30-23 @ 00:04), Max: 99.2 (04-30-23 @ 00:04)  HR: 94 (04-30-23 @ 00:04) (71 - 96)  BP: 114/73 (04-30-23 @ 00:04) (114/73 - 131/84)  BP(mean): 94 (04-29-23 @ 22:23) (94 - 94)  RR: 17 (04-30-23 @ 00:04) (16 - 17)  SpO2: 100% (04-30-23 @ 00:04) (97% - 100%)      PHYSICAL EXAM:    GEN: NAD, AAOx3  ABD: soft, NT/ND, no guarding or rigidity, neobladder stoma present  EXT: b/l LE with SCDS on  SKIN: No rashes, lesions, ulcerations

## 2023-04-30 NOTE — CONSULT NOTE ADULT - SUBJECTIVE AND OBJECTIVE BOX
Clinical History: 33 y.o. M patient who was transferred from [place] for management of a 9x6 mm obstructing right upj stone.  Had right flank pain, but denied N/V/D/F.  Currently pain is manageable for patient, 6/10, sharp.   Self caths through neobladder, denies purulence or blood. CT of the Abdomen and Pelvis demonstrated a "mild right perinephric stranding and moderate right hydronephrosis secondary to a 9 x 6 mm stone at the UPJ". IR consulted for right sided nephrostomy placement.    Handoff    MEWS Score    Renal stones    Bladder stone    Right ureteral stone    Duplication of bladder    SysAdmin_VstLnk        Meds:cefTRIAXone   IVPB 1000 milliGRAM(s) IV Intermittent every 24 hours  HYDROmorphone  Injectable 0.5 milliGRAM(s) IV Push every 4 hours PRN  sodium chloride 0.9%. 1000 milliLiter(s) IV Continuous <Continuous>      Allergies:No Known Allergies        Labs:                           12.8   10.53 )-----------( 178      ( 30 Apr 2023 07:41 )             41.2     PT/INR - ( 30 Apr 2023 07:41 )   PT: 13.6 sec;   INR: 1.14          PTT - ( 30 Apr 2023 07:41 )  PTT:32.4 sec  04-30    138  |  105  |  16  ----------------------------<  81  4.0   |  18<L>  |  1.45<H>    Ca    8.0<L>      30 Apr 2023 07:41  Phos  3.6     04-30  Mg     1.6     04-30    TPro  7.1  /  Alb  3.7  /  TBili  0.5  /  DBili  x   /  AST  17  /  ALT  16  /  AlkPhos  68  04-29      Imaging Findings:  < from: CT Renal Stone Hunt (04.29.23 @ 12:07) >    ACC: [id number] EXAM:  CT RENAL STONE MARINELLI   ORDERED BY: ISABEL HESTER     PROCEDURE DATE:  04/29/2023          INTERPRETATION:  CLINICAL INFORMATION: Right flank pain. History of   kidney stones, neobladder.    COMPARISON: Contrast-enhanced CT of the abdomen and pelvis March 6, 2023.    PROCEDURE:  CT of the Abdomen and Pelvis was performed in the prone position.  Sagittal and coronal reformats were performed.    FINDINGS:  LOWER CHEST: Again is noted severe elevation of the left hemidiaphragm.   LIVER: Within normal limits.  BILE DUCTS: Normal caliber.  GALLBLADDER: Within normal limits.  SPLEEN: Within normal limits.  PANCREAS: Within normal limits.  ADRENALS: Within normal limits.  KIDNEYS/URETERS: There is mild right perinephric stranding and moderate   right hydronephrosis secondary to a 9 x 6 mm stone at the UPJ on image   69. There are additional punctate nonobstructing right renal stones.   Again is noted a severely atrophic left kidney with multiple   nonobstructing stones.    BLADDER: Again is noted a neobladder in the anterior abdomen extending to   the umbilicus.  REPRODUCTIVE ORGANS: The prostate is not enlarged.    BOWEL: No bowel obstruction.  PERITONEUM: No ascites.  VESSELS: Within normal limits.  RETROPERITONEUM/LYMPH NODES: No lymphadenopathy.  ABDOMINAL WALL: Within normal limits.  BONES: Within normal limits.    IMPRESSION: Moderately obstructing right UPJ stone.    --- End of Report ---            CHRISTINE MAKI MD; Attending Radiologist  This document has been electronically signed. Apr 29 2023 12:13PM    < end of copied text >

## 2023-04-30 NOTE — CONSULT NOTE ADULT - ASSESSMENT
Assessment: 33 y.o. M patient who was transferred from [place] for management of a 9x6 mm obstructing right upj stone.  Had right flank pain, but denied N/V/D/F.  Currently pain is manageable for patient, 6/10, sharp.   Self caths through neobladder, denies purulence or blood. CT of the Abdomen and Pelvis demonstrated a "mild right perinephric stranding and moderate right hydronephrosis secondary to a 9 x 6 mm stone at the UPJ". IR consulted for right sided nephrostomy placement. Case and imaging reviewed by attending Dr. Ortiz, with plan for R nephrostomy today.      Recommendations:  - Plan for R nephrostomy tube placement w/ anesthesia with Dr. Ortiz today (4/30)  - Please keep patient NPO    Communicated with:  Primary team (Urology) Assessment: 33 y.o. M patient who was transferred from [place] for management of a 9x6 mm obstructing right upj stone.  Had right flank pain, but denied N/V/D/F.  Currently pain is manageable for patient, 6/10, sharp.   Self caths through neobladder, denies purulence or blood. CT of the Abdomen and Pelvis demonstrated a "mild right perinephric stranding and moderate right hydronephrosis secondary to a 9 x 6 mm stone at the UPJ". IR consulted for right sided nephrostomy placement. Case and imaging reviewed by attending Dr. Ortiz, with plan for R nephrostomy today.      Recommendations:  - Plan for R nephrostomy tube placement w/ anesthesia with Dr. Ortiz today (4/30)  - Please keep patient NPO    Communicated with:  Primary team (Urology JUAN ANTONIO Correia)

## 2023-04-30 NOTE — PRE-ANESTHESIA EVALUATION ADULT - NSANTHOSAYNRD_GEN_A_CORE
No. SERG screening performed.  STOP BANG Legend: 0-2 = LOW Risk; 3-4 = INTERMEDIATE Risk; 5-8 = HIGH Risk

## 2023-04-30 NOTE — PROGRESS NOTE ADULT - PROBLEM SELECTOR PLAN 1
-Discussed with on call  resident  -Ceftriaxone  -NPO  -F/U IR consult - possible intervention today vs Monday  -Pain control prn  -IS/SCDS  -OOB  -F/U blood and urine cultures -Discussed with on call  resident  -Ceftriaxone  -NPO  -Nephrostomy tube placment today  -Pain control prn  -IS/SCDS  -OOB  -F/U blood and urine cultures

## 2023-05-01 LAB
ANION GAP SERPL CALC-SCNC: 11 MMOL/L — SIGNIFICANT CHANGE UP (ref 5–17)
BUN SERPL-MCNC: 11 MG/DL — SIGNIFICANT CHANGE UP (ref 7–23)
CALCIUM SERPL-MCNC: 7.7 MG/DL — LOW (ref 8.4–10.5)
CHLORIDE SERPL-SCNC: 102 MMOL/L — SIGNIFICANT CHANGE UP (ref 96–108)
CO2 SERPL-SCNC: 20 MMOL/L — LOW (ref 22–31)
CREAT SERPL-MCNC: 1.31 MG/DL — HIGH (ref 0.5–1.3)
EGFR: 74 ML/MIN/1.73M2 — SIGNIFICANT CHANGE UP
GLUCOSE SERPL-MCNC: 102 MG/DL — HIGH (ref 70–99)
HCT VFR BLD CALC: 39.6 % — SIGNIFICANT CHANGE UP (ref 39–50)
HGB BLD-MCNC: 12.1 G/DL — LOW (ref 13–17)
MAGNESIUM SERPL-MCNC: 1.5 MG/DL — LOW (ref 1.6–2.6)
MCHC RBC-ENTMCNC: 26.8 PG — LOW (ref 27–34)
MCHC RBC-ENTMCNC: 30.6 GM/DL — LOW (ref 32–36)
MCV RBC AUTO: 87.6 FL — SIGNIFICANT CHANGE UP (ref 80–100)
NRBC # BLD: 0 /100 WBCS — SIGNIFICANT CHANGE UP (ref 0–0)
PHOSPHATE SERPL-MCNC: 1.9 MG/DL — LOW (ref 2.5–4.5)
PLATELET # BLD AUTO: 141 K/UL — LOW (ref 150–400)
POTASSIUM SERPL-MCNC: 3.9 MMOL/L — SIGNIFICANT CHANGE UP (ref 3.5–5.3)
POTASSIUM SERPL-SCNC: 3.9 MMOL/L — SIGNIFICANT CHANGE UP (ref 3.5–5.3)
RBC # BLD: 4.52 M/UL — SIGNIFICANT CHANGE UP (ref 4.2–5.8)
RBC # FLD: 13.4 % — SIGNIFICANT CHANGE UP (ref 10.3–14.5)
SODIUM SERPL-SCNC: 133 MMOL/L — LOW (ref 135–145)
WBC # BLD: 7.99 K/UL — SIGNIFICANT CHANGE UP (ref 3.8–10.5)
WBC # FLD AUTO: 7.99 K/UL — SIGNIFICANT CHANGE UP (ref 3.8–10.5)

## 2023-05-01 RX ORDER — MAGNESIUM SULFATE 500 MG/ML
2 VIAL (ML) INJECTION ONCE
Refills: 0 | Status: COMPLETED | OUTPATIENT
Start: 2023-05-01 | End: 2023-05-01

## 2023-05-01 RX ORDER — PIPERACILLIN AND TAZOBACTAM 4; .5 G/20ML; G/20ML
4.5 INJECTION, POWDER, LYOPHILIZED, FOR SOLUTION INTRAVENOUS ONCE
Refills: 0 | Status: COMPLETED | OUTPATIENT
Start: 2023-05-01 | End: 2023-05-01

## 2023-05-01 RX ORDER — PIPERACILLIN AND TAZOBACTAM 4; .5 G/20ML; G/20ML
4.5 INJECTION, POWDER, LYOPHILIZED, FOR SOLUTION INTRAVENOUS EVERY 8 HOURS
Refills: 0 | Status: DISCONTINUED | OUTPATIENT
Start: 2023-05-02 | End: 2023-05-02

## 2023-05-01 RX ORDER — PIPERACILLIN AND TAZOBACTAM 4; .5 G/20ML; G/20ML
3.38 INJECTION, POWDER, LYOPHILIZED, FOR SOLUTION INTRAVENOUS ONCE
Refills: 0 | Status: DISCONTINUED | OUTPATIENT
Start: 2023-05-01 | End: 2023-05-01

## 2023-05-01 RX ORDER — OXYCODONE HYDROCHLORIDE 5 MG/1
5 TABLET ORAL EVERY 8 HOURS
Refills: 0 | Status: DISCONTINUED | OUTPATIENT
Start: 2023-05-01 | End: 2023-05-02

## 2023-05-01 RX ORDER — PIPERACILLIN AND TAZOBACTAM 4; .5 G/20ML; G/20ML
4.5 INJECTION, POWDER, LYOPHILIZED, FOR SOLUTION INTRAVENOUS ONCE
Refills: 0 | Status: COMPLETED | OUTPATIENT
Start: 2023-05-02 | End: 2023-05-02

## 2023-05-01 RX ORDER — ACETAMINOPHEN 500 MG
650 TABLET ORAL EVERY 6 HOURS
Refills: 0 | Status: DISCONTINUED | OUTPATIENT
Start: 2023-05-01 | End: 2023-05-02

## 2023-05-01 RX ADMIN — OXYCODONE HYDROCHLORIDE 5 MILLIGRAM(S): 5 TABLET ORAL at 05:56

## 2023-05-01 RX ADMIN — Medication 650 MILLIGRAM(S): at 23:35

## 2023-05-01 RX ADMIN — CEFTRIAXONE 100 MILLIGRAM(S): 500 INJECTION, POWDER, FOR SOLUTION INTRAMUSCULAR; INTRAVENOUS at 12:40

## 2023-05-01 RX ADMIN — Medication 650 MILLIGRAM(S): at 17:14

## 2023-05-01 RX ADMIN — Medication 650 MILLIGRAM(S): at 16:14

## 2023-05-01 RX ADMIN — SODIUM CHLORIDE 100 MILLILITER(S): 9 INJECTION INTRAMUSCULAR; INTRAVENOUS; SUBCUTANEOUS at 19:19

## 2023-05-01 RX ADMIN — PIPERACILLIN AND TAZOBACTAM 25 GRAM(S): 4; .5 INJECTION, POWDER, LYOPHILIZED, FOR SOLUTION INTRAVENOUS at 19:18

## 2023-05-01 RX ADMIN — Medication 62.5 MILLIMOLE(S): at 12:05

## 2023-05-01 RX ADMIN — PIPERACILLIN AND TAZOBACTAM 200 GRAM(S): 4; .5 INJECTION, POWDER, LYOPHILIZED, FOR SOLUTION INTRAVENOUS at 15:32

## 2023-05-01 RX ADMIN — SODIUM CHLORIDE 100 MILLILITER(S): 9 INJECTION INTRAMUSCULAR; INTRAVENOUS; SUBCUTANEOUS at 12:05

## 2023-05-01 RX ADMIN — OXYCODONE HYDROCHLORIDE 5 MILLIGRAM(S): 5 TABLET ORAL at 06:56

## 2023-05-01 RX ADMIN — Medication 25 GRAM(S): at 09:51

## 2023-05-01 NOTE — PROGRESS NOTE ADULT - SUBJECTIVE AND OBJECTIVE BOX
UROLOGY PROGRESS NOTE    SUBJECTIVE: Patient seen and examined bedside.    cefTRIAXone   IVPB 1000 milliGRAM(s) IV Intermittent every 24 hours      Vital Signs Last 24 Hrs  T(C): 37.4 (01 May 2023 00:21), Max: 37.6 (2023 20:30)  T(F): 99.3 (01 May 2023 00:21), Max: 99.6 (2023 20:30)  HR: 109 (01 May 2023 00:21) (97 - 115)  BP: 100/66 (01 May 2023 00:21) (98/62 - 118/78)  BP(mean): 82 (2023 13:17) (82 - 82)  RR: 18 (01 May 2023 00:21) (17 - 18)  SpO2: 97% (01 May 2023 00:21) (96% - 98%)    Parameters below as of 01 May 2023 00:21  Patient On (Oxygen Delivery Method): room air      I&O's Detail    2023 07:01  -  2023 07:00  --------------------------------------------------------  IN:    sodium chloride 0.9%: 1200 mL  Total IN: 1200 mL    OUT:    Oral Fluid: 0 mL    Voided (mL): 1750 mL  Total OUT: 1750 mL    Total NET: -550 mL      2023 07:01  -  01 May 2023 05:44  --------------------------------------------------------  IN:    IV PiggyBack: 100 mL    Oral Fluid: 720 mL    sodium chloride 0.9%: 2000 mL  Total IN: 2820 mL    OUT:    Nephrostomy Tube (mL): 1175 mL    Voided (mL): 1800 mL  Total OUT: 2975 mL    Total NET: -155 mL          PHYSICAL EXAM    General: NAD, resting comfortably in bed  C/V: NSR  Pulm: Nonlabored breathing, no respiratory distress on room air  Abd: soft, ND, appropriate incisional tenderness, no rebound tenderness, no guarding  Extrem: WWP, no edema, SCDs in place        LABS:                        12.8   10.53 )-----------( 178      ( 2023 07:41 )             41.2         138  |  105  |  16  ----------------------------<  81  4.0   |  18<L>  |  1.45<H>    Ca    8.0<L>      2023 07:41  Phos  3.6       Mg     1.6         TPro  7.1  /  Alb  3.7  /  TBili  0.5  /  DBili  x   /  AST  17  /  ALT  16  /  AlkPhos  68  04-29    PT/INR - ( 2023 07:41 )   PT: 13.6 sec;   INR: 1.14          PTT - ( 2023 07:41 )  PTT:32.4 sec  Urinalysis Basic - ( 2023 11:30 )    Color: Yellow / Appearance: Clear / S.006 / pH: x  Gluc: x / Ketone: Negative mg/dL  / Bili: Negative / Urobili: 0.2 mg/dL   Blood: x / Protein: Trace mg/dL / Nitrite: Negative   Leuk Esterase: Large / RBC: 2 /HPF / WBC 14 /HPF   Sq Epi: x / Non Sq Epi: x / Bacteria: Occasional /HPF        RADIOLOGY & ADDITIONAL STUDIES:

## 2023-05-01 NOTE — PROGRESS NOTE ADULT - ASSESSMENT
33M patient with 9x6mm right obstructing UPJ stone transferred from [facility]; functionally solitary R kidney with history of bladder augmentation with catheterizable stoma. Now s/p IR PCN 4/30.

## 2023-05-02 ENCOUNTER — TRANSCRIPTION ENCOUNTER (OUTPATIENT)
Age: 33
End: 2023-05-02

## 2023-05-02 VITALS
TEMPERATURE: 98 F | SYSTOLIC BLOOD PRESSURE: 114 MMHG | RESPIRATION RATE: 16 BRPM | HEART RATE: 82 BPM | OXYGEN SATURATION: 99 % | DIASTOLIC BLOOD PRESSURE: 76 MMHG

## 2023-05-02 LAB
-  AZTREONAM: SIGNIFICANT CHANGE UP
-  CEFEPIME: SIGNIFICANT CHANGE UP
-  CIPROFLOXACIN: SIGNIFICANT CHANGE UP
-  GENTAMICIN: SIGNIFICANT CHANGE UP
-  LEVOFLOXACIN: SIGNIFICANT CHANGE UP
-  PIPERACILLIN/TAZOBACTAM: SIGNIFICANT CHANGE UP
-  TOBRAMYCIN: SIGNIFICANT CHANGE UP
ANION GAP SERPL CALC-SCNC: 9 MMOL/L — SIGNIFICANT CHANGE UP (ref 5–17)
BUN SERPL-MCNC: 8 MG/DL — SIGNIFICANT CHANGE UP (ref 7–23)
CALCIUM SERPL-MCNC: 7.8 MG/DL — LOW (ref 8.4–10.5)
CHLORIDE SERPL-SCNC: 106 MMOL/L — SIGNIFICANT CHANGE UP (ref 96–108)
CO2 SERPL-SCNC: 23 MMOL/L — SIGNIFICANT CHANGE UP (ref 22–31)
CREAT SERPL-MCNC: 1.2 MG/DL — SIGNIFICANT CHANGE UP (ref 0.5–1.3)
CULTURE RESULTS: SIGNIFICANT CHANGE UP
EGFR: 82 ML/MIN/1.73M2 — SIGNIFICANT CHANGE UP
GLUCOSE SERPL-MCNC: 107 MG/DL — HIGH (ref 70–99)
HCT VFR BLD CALC: 35.5 % — LOW (ref 39–50)
HGB BLD-MCNC: 11 G/DL — LOW (ref 13–17)
MAGNESIUM SERPL-MCNC: 1.7 MG/DL — SIGNIFICANT CHANGE UP (ref 1.6–2.6)
MCHC RBC-ENTMCNC: 26.3 PG — LOW (ref 27–34)
MCHC RBC-ENTMCNC: 31 GM/DL — LOW (ref 32–36)
MCV RBC AUTO: 84.9 FL — SIGNIFICANT CHANGE UP (ref 80–100)
METHOD TYPE: SIGNIFICANT CHANGE UP
NRBC # BLD: 0 /100 WBCS — SIGNIFICANT CHANGE UP (ref 0–0)
ORGANISM # SPEC MICROSCOPIC CNT: SIGNIFICANT CHANGE UP
ORGANISM # SPEC MICROSCOPIC CNT: SIGNIFICANT CHANGE UP
PHOSPHATE SERPL-MCNC: 2.6 MG/DL — SIGNIFICANT CHANGE UP (ref 2.5–4.5)
PLATELET # BLD AUTO: 153 K/UL — SIGNIFICANT CHANGE UP (ref 150–400)
POTASSIUM SERPL-MCNC: 3.6 MMOL/L — SIGNIFICANT CHANGE UP (ref 3.5–5.3)
POTASSIUM SERPL-SCNC: 3.6 MMOL/L — SIGNIFICANT CHANGE UP (ref 3.5–5.3)
RBC # BLD: 4.18 M/UL — LOW (ref 4.2–5.8)
RBC # FLD: 13.2 % — SIGNIFICANT CHANGE UP (ref 10.3–14.5)
SODIUM SERPL-SCNC: 138 MMOL/L — SIGNIFICANT CHANGE UP (ref 135–145)
SPECIMEN SOURCE: SIGNIFICANT CHANGE UP
WBC # BLD: 4.79 K/UL — SIGNIFICANT CHANGE UP (ref 3.8–10.5)
WBC # FLD AUTO: 4.79 K/UL — SIGNIFICANT CHANGE UP (ref 3.8–10.5)

## 2023-05-02 PROCEDURE — 85610 PROTHROMBIN TIME: CPT

## 2023-05-02 PROCEDURE — C1729: CPT

## 2023-05-02 PROCEDURE — C1769: CPT

## 2023-05-02 PROCEDURE — 80048 BASIC METABOLIC PNL TOTAL CA: CPT

## 2023-05-02 PROCEDURE — 84100 ASSAY OF PHOSPHORUS: CPT

## 2023-05-02 PROCEDURE — 86900 BLOOD TYPING SEROLOGIC ABO: CPT

## 2023-05-02 PROCEDURE — C1894: CPT

## 2023-05-02 PROCEDURE — 50432 PLMT NEPHROSTOMY CATHETER: CPT

## 2023-05-02 PROCEDURE — 85730 THROMBOPLASTIN TIME PARTIAL: CPT

## 2023-05-02 PROCEDURE — 86901 BLOOD TYPING SEROLOGIC RH(D): CPT

## 2023-05-02 PROCEDURE — 86850 RBC ANTIBODY SCREEN: CPT

## 2023-05-02 PROCEDURE — 87086 URINE CULTURE/COLONY COUNT: CPT

## 2023-05-02 PROCEDURE — 36415 COLL VENOUS BLD VENIPUNCTURE: CPT

## 2023-05-02 PROCEDURE — 83735 ASSAY OF MAGNESIUM: CPT

## 2023-05-02 PROCEDURE — 85027 COMPLETE CBC AUTOMATED: CPT

## 2023-05-02 PROCEDURE — 87186 SC STD MICRODIL/AGAR DIL: CPT

## 2023-05-02 PROCEDURE — 99238 HOSP IP/OBS DSCHRG MGMT 30/<: CPT

## 2023-05-02 RX ORDER — NITROFURANTOIN MACROCRYSTAL 50 MG
1 CAPSULE ORAL
Refills: 0 | DISCHARGE

## 2023-05-02 RX ORDER — CIPROFLOXACIN LACTATE 400MG/40ML
1 VIAL (ML) INTRAVENOUS
Qty: 26 | Refills: 0
Start: 2023-05-02 | End: 2023-05-14

## 2023-05-02 RX ADMIN — PIPERACILLIN AND TAZOBACTAM 25 GRAM(S): 4; .5 INJECTION, POWDER, LYOPHILIZED, FOR SOLUTION INTRAVENOUS at 10:18

## 2023-05-02 RX ADMIN — PIPERACILLIN AND TAZOBACTAM 25 GRAM(S): 4; .5 INJECTION, POWDER, LYOPHILIZED, FOR SOLUTION INTRAVENOUS at 02:15

## 2023-05-02 RX ADMIN — Medication 650 MILLIGRAM(S): at 00:35

## 2023-05-02 NOTE — DISCHARGE NOTE NURSING/CASE MANAGEMENT/SOCIAL WORK - NSDCPEFALRISK_GEN_ALL_CORE
For information on Fall & Injury Prevention, visit: [email or web address] OR  [email or web address] OR  https://www.cdc.gov/steadi/patient.html

## 2023-05-02 NOTE — DISCHARGE NOTE PROVIDER - HOSPITAL COURSE
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 He was admitted and underwent uncomplicated right nephrostomy tube placement by interventional radiology on 4/30. His amy and postprocedural courses were unremarkable, except for a low grade fever that responded appropriately to antibiotics. He complains of no pain, nausea, fevers, chills, and is comfortable at rest. PCN draining clear yellow urine. He is optimized for discharge today with PO antibiotics in stable condition.

## 2023-05-02 NOTE — DISCHARGE NOTE NURSING/CASE MANAGEMENT/SOCIAL WORK - PATIENT PORTAL LINK FT
You can access the FollowMyHealth Patient Portal offered by [facility] by registering at the following website: [email or web address]. By joining [facility]’s FollowMyHealth portal, you will also be able to view your health information using other applications (apps) compatible with our system.

## 2023-05-02 NOTE — DISCHARGE NOTE PROVIDER - NSDCCPCAREPLAN_GEN_ALL_CORE_FT
PRINCIPAL DISCHARGE DIAGNOSIS  Diagnosis: Right ureteral stone  Assessment and Plan of Treatment:

## 2023-05-02 NOTE — PROGRESS NOTE ADULT - SUBJECTIVE AND OBJECTIVE BOX
INTERVAL HPI/OVERNIGHT EVENTS:  No acute events overnight.    VITALS:    T(F): 97.9 (05-02-23 @ 05:30), Max: 98.6 (05-01-23 @ 13:49)  HR: 82 (05-02-23 @ 05:30) (77 - 95)  BP: 110/73 (05-02-23 @ 05:30) (103/68 - 114/78)  RR: 18 (05-02-23 @ 05:30) (17 - 18)  SpO2: 97% (05-02-23 @ 05:30) (96% - 98%)  Wt(kg): --    I&O's Detail    30 Apr 2023 07:01  -  01 May 2023 07:00  --------------------------------------------------------  IN:    IV PiggyBack: 100 mL    Oral Fluid: 720 mL    sodium chloride 0.9%: 2100 mL  Total IN: 2920 mL    OUT:    Nephrostomy Tube (mL): 1175 mL    Voided (mL): 2200 mL  Total OUT: 3375 mL    Total NET: -455 mL      01 May 2023 07:01  -  02 May 2023 05:52  --------------------------------------------------------  IN:    IV PiggyBack: 100 mL    Oral Fluid: 480 mL    sodium chloride 0.9%: 2300 mL  Total IN: 2880 mL    OUT:    Nephrostomy Tube (mL): 2900 mL    Voided (mL): 1750 mL  Total OUT: 4650 mL    Total NET: -1770 mL          MEDICATIONS:    ANTIBIOTICS:  piperacillin/tazobactam IVPB.- 4.5 Gram(s) IV Intermittent once  piperacillin/tazobactam IVPB.. 4.5 Gram(s) IV Intermittent every 8 hours      PAIN CONTROL:  acetaminophen     Tablet .. 650 milliGRAM(s) Oral every 6 hours PRN  HYDROmorphone  Injectable 0.5 milliGRAM(s) IV Push every 4 hours PRN  oxyCODONE    IR 5 milliGRAM(s) Oral every 8 hours PRN       MEDS:      HEME/ONC        PHYSICAL EXAM:  General: No acute distress.  Alert and Oriented x 3  Abdominal Exam: soft nt/nd. PCN noted to right flank draining well.    Exam: Negative SP tenderness.       LABS:                        12.1   7.99  )-----------( 141      ( 01 May 2023 05:30 )             39.6     05-01    133<L>  |  102  |  11  ----------------------------<  102<H>  3.9   |  20<L>  |  1.31<H>    Ca    7.7<L>      01 May 2023 05:30  Phos  1.9     05-01  Mg     1.5     05-01      PT/INR - ( 30 Apr 2023 07:41 )   PT: 13.6 sec;   INR: 1.14          PTT - ( 30 Apr 2023 07:41 )  PTT:32.4 sec      RADIOLOGY & ADDITIONAL TESTS:

## 2023-05-02 NOTE — DISCHARGE NOTE PROVIDER - CARE PROVIDER_API CALL
Silvano Martin)  Urology  [address] [id number]  Phone: [phone]  Fax: [phone]  Follow Up Time:

## 2023-05-02 NOTE — DISCHARGE NOTE PROVIDER - NSDCFUADDINST_GEN_ALL_CORE_FT
Please follow up with Dr. Martin, or with your urologist Dr. Marques Hightower, on discharge. Your stone will need definitive treatment. Please take the antibiotics given to you on discharge as prescribed and finish the entire course. Call Dr. Martin's office for any questions.

## 2023-05-02 NOTE — PROGRESS NOTE ADULT - ASSESSMENT
ASSESSMENT: 33yMale w/ right UPJ stone s/p right PCNL.  Patient afebrile overnight, VSS, HDS.     PLAN:  Diet: Reg  Pain control  Monitor Urine Output  DVT ppx  Cont Abx: Zosyn   OOB/IS

## 2023-05-05 DIAGNOSIS — N13.5 CROSSING VESSEL AND STRICTURE OF URETER WITHOUT HYDRONEPHROSIS: ICD-10-CM

## 2023-05-05 DIAGNOSIS — N13.2 HYDRONEPHROSIS WITH RENAL AND URETERAL CALCULOUS OBSTRUCTION: ICD-10-CM

## 2023-05-05 DIAGNOSIS — N26.1 ATROPHY OF KIDNEY (TERMINAL): ICD-10-CM

## 2023-05-05 DIAGNOSIS — E87.1 HYPO-OSMOLALITY AND HYPONATREMIA: ICD-10-CM

## 2023-05-05 DIAGNOSIS — N20.1 CALCULUS OF URETER: ICD-10-CM

## 2023-05-05 LAB
CULTURE RESULTS: SIGNIFICANT CHANGE UP
CULTURE RESULTS: SIGNIFICANT CHANGE UP
SPECIMEN SOURCE: SIGNIFICANT CHANGE UP
SPECIMEN SOURCE: SIGNIFICANT CHANGE UP

## 2024-01-25 NOTE — DISCHARGE NOTE PROVIDER - NSDCMRMEDTOKEN_GEN_ALL_CORE_FT
Goal Outcome Evaluation:  Plan of Care Reviewed With: patient        Progress: no change  Outcome Summary: Received as an admission from ED.  [facility]  VSS.  Has had x2 more bm's with blood since arrival to floor.  Repeat labs pending.  Straight cath done and UA sent to lab.  plan GI consult this morning.  
Cipro 500 mg oral tablet: 1 tab(s) orally every 12 hours Please take and finish as directed  
Heterosexual

## 2024-06-08 NOTE — ED PROVIDER NOTE - RESPIRATORY, MLM
Breath sounds clear and equal bilaterally.
PAST SURGICAL HISTORY:  History of blood transfusion     S/P  Section ,,

## 2024-11-05 NOTE — ED ADULT NURSE NOTE - CHPI ED NUR SYMPTOMS NEG
----- Message from Selvin Renteria MD sent at 11/5/2024  2:02 PM CST -----  Please notify the patient of preop ok   no abdominal distension/no blood in stool/no burning urination/no chills/no diarrhea/no dysuria/no fever/no hematuria/no nausea/no vomiting

## 2024-12-16 NOTE — PATIENT PROFILE ADULT - NSPROEXTENSIONSOFSELF_GEN_A_NUR
This medication refill is regarding a electronic request. Refill requested by  [facility] .    Requested Prescriptions     Pending Prescriptions Disp Refills    vitamin B complex (NATURES BLEND B COMPLEX) TABS tablet [Pharmacy Med Name: B Complex Formula 1 Oral Tablet] 120 tablet 0     Sig: TAKE 1 TABLET BY MOUTH 4 TIMES DAILY AFTER  MEALS  AND  AT  BEDTIME     Date of last visit: 9/23/2024   Date of next visit: 9/25/2025  Date of last refill: 3/19/24 #120/5    Rx verified, ordered and set to EP.      
none